# Patient Record
Sex: FEMALE | Race: WHITE | NOT HISPANIC OR LATINO | Employment: OTHER | URBAN - METROPOLITAN AREA
[De-identification: names, ages, dates, MRNs, and addresses within clinical notes are randomized per-mention and may not be internally consistent; named-entity substitution may affect disease eponyms.]

---

## 2023-08-03 LAB
ALANINE AMINOTRANSFERASE (SGPT) (U/L) IN SER/PLAS: 120 U/L (ref 7–45)
ALBUMIN (G/DL) IN SER/PLAS: 3.5 G/DL (ref 3.4–5)
ALKALINE PHOSPHATASE (U/L) IN SER/PLAS: 85 U/L (ref 33–136)
ANION GAP IN SER/PLAS: 11 MMOL/L (ref 10–20)
ASPARTATE AMINOTRANSFERASE (SGOT) (U/L) IN SER/PLAS: 128 U/L (ref 9–39)
BILIRUBIN TOTAL (MG/DL) IN SER/PLAS: 0.8 MG/DL (ref 0–1.2)
CALCIDIOL (25 OH VITAMIN D3) (NG/ML) IN SER/PLAS: 41 NG/ML
CALCIUM (MG/DL) IN SER/PLAS: 10.8 MG/DL (ref 8.6–10.6)
CARBON DIOXIDE, TOTAL (MMOL/L) IN SER/PLAS: 25 MMOL/L (ref 21–32)
CHLORIDE (MMOL/L) IN SER/PLAS: 104 MMOL/L (ref 98–107)
CREATININE (MG/DL) IN SER/PLAS: 1.17 MG/DL (ref 0.5–1.05)
ESTIMATED AVERAGE GLUCOSE FOR HBA1C: 108 MG/DL
GFR FEMALE: 44 ML/MIN/1.73M2
GLUCOSE (MG/DL) IN SER/PLAS: 99 MG/DL (ref 74–99)
HEMOGLOBIN A1C/HEMOGLOBIN TOTAL IN BLOOD: 5.4 %
MAGNESIUM (MG/DL) IN SER/PLAS: 1.65 MG/DL (ref 1.6–2.4)
POTASSIUM (MMOL/L) IN SER/PLAS: 5 MMOL/L (ref 3.5–5.3)
PROTEIN TOTAL: 6.6 G/DL (ref 6.4–8.2)
SODIUM (MMOL/L) IN SER/PLAS: 135 MMOL/L (ref 136–145)
UREA NITROGEN (MG/DL) IN SER/PLAS: 51 MG/DL (ref 6–23)

## 2023-11-05 DIAGNOSIS — I50.9 CONGESTIVE HEART FAILURE, UNSPECIFIED HF CHRONICITY, UNSPECIFIED HEART FAILURE TYPE (MULTI): Primary | ICD-10-CM

## 2023-11-08 RX ORDER — SPIRONOLACTONE 25 MG/1
25 TABLET ORAL DAILY
Qty: 90 TABLET | Refills: 0 | Status: SHIPPED | OUTPATIENT
Start: 2023-11-08 | End: 2024-01-29 | Stop reason: SDUPTHER

## 2023-11-16 ENCOUNTER — HOSPITAL ENCOUNTER (OUTPATIENT)
Dept: CARDIOLOGY | Facility: HOSPITAL | Age: 88
Discharge: HOME | End: 2023-11-16
Payer: COMMERCIAL

## 2023-11-16 DIAGNOSIS — Z95.0 PRESENCE OF CARDIAC PACEMAKER: Primary | ICD-10-CM

## 2023-11-16 DIAGNOSIS — Z95.0 PRESENCE OF CARDIAC PACEMAKER: ICD-10-CM

## 2023-11-16 DIAGNOSIS — I48.0 PAROXYSMAL ATRIAL FIBRILLATION (MULTI): ICD-10-CM

## 2023-11-16 DIAGNOSIS — I49.5 SICK SINUS SYNDROME (MULTI): ICD-10-CM

## 2023-11-16 PROCEDURE — 93279 PRGRMG DEV EVAL PM/LDLS PM: CPT

## 2023-11-16 PROCEDURE — 93279 PRGRMG DEV EVAL PM/LDLS PM: CPT | Performed by: INTERNAL MEDICINE

## 2023-12-11 DIAGNOSIS — I48.0 PAROXYSMAL ATRIAL FIBRILLATION (MULTI): Primary | ICD-10-CM

## 2023-12-12 RX ORDER — METOPROLOL SUCCINATE 50 MG/1
50 TABLET, EXTENDED RELEASE ORAL DAILY
Qty: 90 TABLET | Refills: 3 | Status: SHIPPED | OUTPATIENT
Start: 2023-12-12 | End: 2024-01-29 | Stop reason: SDUPTHER

## 2024-01-14 DIAGNOSIS — I50.32 CHRONIC DIASTOLIC CONGESTIVE HEART FAILURE (MULTI): Primary | ICD-10-CM

## 2024-01-14 DIAGNOSIS — I48.0 PAROXYSMAL ATRIAL FIBRILLATION (MULTI): ICD-10-CM

## 2024-01-17 PROBLEM — K21.9 GERD (GASTROESOPHAGEAL REFLUX DISEASE): Status: ACTIVE | Noted: 2024-01-17

## 2024-01-17 PROBLEM — I65.21 ARTERIOSCLEROSIS OF RIGHT CAROTID ARTERY: Status: ACTIVE | Noted: 2024-01-17

## 2024-01-17 PROBLEM — B18.2 HEPATITIS C, CHRONIC (MULTI): Status: ACTIVE | Noted: 2024-01-17

## 2024-01-17 PROBLEM — Z95.0 PRESENCE OF CARDIAC PACEMAKER: Status: ACTIVE | Noted: 2024-01-17

## 2024-01-17 PROBLEM — I50.32 CHRONIC DIASTOLIC CONGESTIVE HEART FAILURE (MULTI): Status: ACTIVE | Noted: 2024-01-17

## 2024-01-17 PROBLEM — I48.20 CHRONIC ATRIAL FIBRILLATION (MULTI): Status: ACTIVE | Noted: 2024-01-17

## 2024-01-17 PROBLEM — I10 BENIGN ESSENTIAL HYPERTENSION: Status: ACTIVE | Noted: 2024-01-17

## 2024-01-17 PROBLEM — G45.9 TIA (TRANSIENT ISCHEMIC ATTACK): Status: ACTIVE | Noted: 2024-01-17

## 2024-01-17 PROBLEM — R60.9 DEPENDENT EDEMA: Status: ACTIVE | Noted: 2024-01-17

## 2024-01-17 PROBLEM — E78.5 DYSLIPIDEMIA: Status: ACTIVE | Noted: 2024-01-17

## 2024-01-17 PROBLEM — I69.359 HEMIPARESIS AFFECTING NONDOMINANT SIDE AS LATE EFFECT OF CEREBROVASCULAR ACCIDENT (MULTI): Status: ACTIVE | Noted: 2024-01-17

## 2024-01-17 RX ORDER — LOSARTAN POTASSIUM 50 MG/1
50 TABLET ORAL DAILY
Qty: 90 TABLET | Refills: 0 | Status: SHIPPED | OUTPATIENT
Start: 2024-01-17 | End: 2024-01-29 | Stop reason: SDUPTHER

## 2024-01-17 NOTE — TELEPHONE ENCOUNTER
She has an active script for metoprolol with 3 refills at the pharmacy.  Called and confirmed her needs with her daughter.  She needs her losartan.

## 2024-01-17 NOTE — TELEPHONE ENCOUNTER
----- Message from Hyacinth Barnes sent at 1/15/2024  9:48 AM EST -----  Regarding: med refill  Metoprolol 50mg, Uses [facility] in [place]   She is out

## 2024-01-26 DIAGNOSIS — I50.32 CHRONIC DIASTOLIC CONGESTIVE HEART FAILURE (MULTI): Primary | ICD-10-CM

## 2024-01-26 RX ORDER — POTASSIUM CHLORIDE 20 MEQ/1
20 TABLET, EXTENDED RELEASE ORAL 2 TIMES DAILY
Qty: 60 TABLET | Refills: 0 | Status: SHIPPED | OUTPATIENT
Start: 2024-01-26 | End: 2024-02-08 | Stop reason: SDUPTHER

## 2024-01-26 RX ORDER — FUROSEMIDE 40 MG/1
40 TABLET ORAL DAILY
Qty: 90 TABLET | Refills: 0 | OUTPATIENT
Start: 2024-01-26

## 2024-01-26 NOTE — TELEPHONE ENCOUNTER
Her daughter answered.  She does not have enough potassium to get to her visit with Dr. Lopes on 2/8/24.  Will send 30 day supply.

## 2024-02-07 PROBLEM — R60.0 EDEMA OF BOTH LEGS: Status: ACTIVE | Noted: 2024-02-07

## 2024-02-07 PROBLEM — E55.9 VITAMIN D DEFICIENCY: Status: ACTIVE | Noted: 2024-02-07

## 2024-02-07 PROBLEM — E87.5 HYPERKALEMIA: Status: ACTIVE | Noted: 2024-02-07

## 2024-02-07 PROBLEM — K80.20 GALL BLADDER STONES: Status: ACTIVE | Noted: 2024-02-07

## 2024-02-07 PROBLEM — I50.33 ACUTE ON CHRONIC DIASTOLIC CHF (CONGESTIVE HEART FAILURE), NYHA CLASS 3 (MULTI): Status: ACTIVE | Noted: 2024-01-17

## 2024-02-07 PROBLEM — R06.09 DYSPNEA ON EXERTION: Status: ACTIVE | Noted: 2024-02-07

## 2024-02-07 PROBLEM — R73.03 PREDIABETES: Status: ACTIVE | Noted: 2024-02-07

## 2024-02-07 PROBLEM — E66.9 OBESITY WITH BODY MASS INDEX 30 OR GREATER: Status: ACTIVE | Noted: 2024-02-07

## 2024-02-07 RX ORDER — APIXABAN 5 MG/1
5 TABLET, FILM COATED ORAL 2 TIMES DAILY
COMMUNITY
End: 2024-02-18

## 2024-02-07 RX ORDER — CARVEDILOL 6.25 MG/1
6.25 TABLET ORAL 2 TIMES DAILY
COMMUNITY
Start: 2022-01-21 | End: 2024-02-08 | Stop reason: WASHOUT

## 2024-02-07 RX ORDER — CHOLECALCIFEROL (VITAMIN D3) 1250 MCG
50000 TABLET ORAL 2 TIMES WEEKLY
COMMUNITY
Start: 2017-12-19 | End: 2024-02-15 | Stop reason: ALTCHOICE

## 2024-02-07 RX ORDER — ATORVASTATIN CALCIUM 40 MG/1
40 TABLET, FILM COATED ORAL NIGHTLY
COMMUNITY
End: 2024-02-18 | Stop reason: SDUPTHER

## 2024-02-07 RX ORDER — ATENOLOL 50 MG/1
1 TABLET ORAL DAILY
COMMUNITY
End: 2024-02-08 | Stop reason: WASHOUT

## 2024-02-07 RX ORDER — OMEPRAZOLE 20 MG/1
20 CAPSULE, DELAYED RELEASE ORAL DAILY
COMMUNITY

## 2024-02-07 RX ORDER — LANOLIN ALCOHOL/MO/W.PET/CERES
1 CREAM (GRAM) TOPICAL DAILY
COMMUNITY
Start: 2023-09-21 | End: 2024-03-13 | Stop reason: WASHOUT

## 2024-02-07 RX ORDER — POTASSIUM CHLORIDE 20 MEQ/1
20 TABLET, EXTENDED RELEASE ORAL 2 TIMES DAILY
Qty: 180 TABLET | Refills: 1 | Status: CANCELLED | OUTPATIENT
Start: 2024-02-07 | End: 2024-08-05

## 2024-02-08 ENCOUNTER — OFFICE VISIT (OUTPATIENT)
Dept: CARDIOLOGY | Facility: CLINIC | Age: 89
End: 2024-02-08
Payer: COMMERCIAL

## 2024-02-08 ENCOUNTER — HOSPITAL ENCOUNTER (EMERGENCY)
Facility: HOSPITAL | Age: 89
Discharge: LEFT BEFORE DISCHARGE | End: 2024-02-08
Attending: STUDENT IN AN ORGANIZED HEALTH CARE EDUCATION/TRAINING PROGRAM
Payer: COMMERCIAL

## 2024-02-08 ENCOUNTER — APPOINTMENT (OUTPATIENT)
Dept: RADIOLOGY | Facility: HOSPITAL | Age: 89
End: 2024-02-08
Payer: COMMERCIAL

## 2024-02-08 VITALS
HEIGHT: 64 IN | WEIGHT: 158.07 LBS | BODY MASS INDEX: 26.99 KG/M2 | SYSTOLIC BLOOD PRESSURE: 127 MMHG | RESPIRATION RATE: 17 BRPM | HEART RATE: 64 BPM | DIASTOLIC BLOOD PRESSURE: 70 MMHG | TEMPERATURE: 98.2 F | OXYGEN SATURATION: 96 %

## 2024-02-08 VITALS
HEIGHT: 61 IN | DIASTOLIC BLOOD PRESSURE: 70 MMHG | WEIGHT: 158 LBS | BODY MASS INDEX: 29.83 KG/M2 | SYSTOLIC BLOOD PRESSURE: 130 MMHG | HEART RATE: 67 BPM

## 2024-02-08 DIAGNOSIS — I48.0 PAROXYSMAL ATRIAL FIBRILLATION (MULTI): ICD-10-CM

## 2024-02-08 DIAGNOSIS — R29.6 FALLS FREQUENTLY: Primary | ICD-10-CM

## 2024-02-08 DIAGNOSIS — G45.9 TIA (TRANSIENT ISCHEMIC ATTACK): ICD-10-CM

## 2024-02-08 DIAGNOSIS — I10 BENIGN ESSENTIAL HYPERTENSION: ICD-10-CM

## 2024-02-08 DIAGNOSIS — I50.32 CHRONIC DIASTOLIC CONGESTIVE HEART FAILURE (MULTI): ICD-10-CM

## 2024-02-08 DIAGNOSIS — I50.9 CONGESTIVE HEART FAILURE, UNSPECIFIED HF CHRONICITY, UNSPECIFIED HEART FAILURE TYPE (MULTI): ICD-10-CM
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
## 2024-02-08 PROCEDURE — 3078F DIAST BP <80 MM HG: CPT | Performed by: INTERNAL MEDICINE

## 2024-02-08 PROCEDURE — 72128 CT CHEST SPINE W/O DYE: CPT

## 2024-02-08 PROCEDURE — 99285 EMERGENCY DEPT VISIT HI MDM: CPT | Mod: 25

## 2024-02-08 PROCEDURE — 70450 CT HEAD/BRAIN W/O DYE: CPT

## 2024-02-08 PROCEDURE — 71250 CT THORAX DX C-: CPT

## 2024-02-08 PROCEDURE — 72128 CT CHEST SPINE W/O DYE: CPT | Mod: FOREIGN READ | Performed by: RADIOLOGY

## 2024-02-08 PROCEDURE — 3075F SYST BP GE 130 - 139MM HG: CPT | Performed by: INTERNAL MEDICINE

## 2024-02-08 PROCEDURE — 99285 EMERGENCY DEPT VISIT HI MDM: CPT | Mod: 25 | Performed by: STUDENT IN AN ORGANIZED HEALTH CARE EDUCATION/TRAINING PROGRAM

## 2024-02-08 PROCEDURE — 1160F RVW MEDS BY RX/DR IN RCRD: CPT | Performed by: INTERNAL MEDICINE

## 2024-02-08 PROCEDURE — 1159F MED LIST DOCD IN RCRD: CPT | Performed by: INTERNAL MEDICINE

## 2024-02-08 PROCEDURE — 72125 CT NECK SPINE W/O DYE: CPT | Performed by: RADIOLOGY

## 2024-02-08 PROCEDURE — 1036F TOBACCO NON-USER: CPT | Performed by: INTERNAL MEDICINE

## 2024-02-08 PROCEDURE — 99214 OFFICE O/P EST MOD 30 MIN: CPT | Performed by: INTERNAL MEDICINE

## 2024-02-08 PROCEDURE — 1126F AMNT PAIN NOTED NONE PRSNT: CPT | Performed by: INTERNAL MEDICINE

## 2024-02-08 PROCEDURE — 72125 CT NECK SPINE W/O DYE: CPT

## 2024-02-08 PROCEDURE — 70450 CT HEAD/BRAIN W/O DYE: CPT | Performed by: RADIOLOGY

## 2024-02-08 PROCEDURE — 93000 ELECTROCARDIOGRAM COMPLETE: CPT | Performed by: INTERNAL MEDICINE

## 2024-02-08 PROCEDURE — 71250 CT THORAX DX C-: CPT | Mod: FOREIGN READ | Performed by: RADIOLOGY

## 2024-02-08 RX ORDER — SPIRONOLACTONE 25 MG/1
25 TABLET ORAL DAILY
Qty: 90 TABLET | Refills: 1 | Status: SHIPPED | OUTPATIENT
Start: 2024-02-08 | End: 2024-02-08 | Stop reason: SDUPTHER

## 2024-02-08 RX ORDER — SPIRONOLACTONE 25 MG/1
25 TABLET ORAL DAILY
Qty: 30 TABLET | Refills: 0 | Status: SHIPPED | OUTPATIENT
Start: 2024-02-08 | End: 2024-02-18 | Stop reason: SDUPTHER

## 2024-02-08 RX ORDER — POTASSIUM CHLORIDE 20 MEQ/1
20 TABLET, EXTENDED RELEASE ORAL DAILY
Qty: 30 TABLET | Refills: 0 | Status: SHIPPED | OUTPATIENT
Start: 2024-02-08 | End: 2024-02-08

## 2024-02-08 RX ORDER — FUROSEMIDE 40 MG/1
40 TABLET ORAL DAILY
Qty: 90 TABLET | Refills: 1 | Status: SHIPPED | OUTPATIENT
Start: 2024-02-08 | End: 2024-02-18 | Stop reason: SDUPTHER

## 2024-02-08 RX ORDER — LOSARTAN POTASSIUM 50 MG/1
50 TABLET ORAL DAILY
Qty: 90 TABLET | Refills: 1 | Status: SHIPPED | OUTPATIENT
Start: 2024-02-08 | End: 2024-08-06

## 2024-02-08 RX ORDER — METOPROLOL SUCCINATE 50 MG/1
50 TABLET, EXTENDED RELEASE ORAL DAILY
Qty: 90 TABLET | Refills: 1 | Status: SHIPPED | OUTPATIENT
Start: 2024-02-08 | End: 2024-08-06

## 2024-02-08 RX ORDER — FUROSEMIDE 40 MG/1
40 TABLET ORAL DAILY
COMMUNITY
Start: 2023-11-01 | End: 2024-02-14 | Stop reason: ALTCHOICE

## 2024-02-08 ASSESSMENT — PAIN DESCRIPTION - PAIN TYPE: TYPE: ACUTE PAIN

## 2024-02-08 ASSESSMENT — PAIN DESCRIPTION - LOCATION
LOCATION: HEAD
LOCATION: HEAD

## 2024-02-08 ASSESSMENT — PAIN SCALES - GENERAL
PAINLEVEL_OUTOF10: 4
PAINLEVEL_OUTOF10: 5 - MODERATE PAIN

## 2024-02-08 ASSESSMENT — VISUAL ACUITY: OU: 1

## 2024-02-08 ASSESSMENT — PAIN DESCRIPTION - ORIENTATION
ORIENTATION: RIGHT
ORIENTATION: RIGHT

## 2024-02-08 ASSESSMENT — PAIN DESCRIPTION - DESCRIPTORS
DESCRIPTORS: ACHING
DESCRIPTORS: ACHING

## 2024-02-08 ASSESSMENT — PAIN - FUNCTIONAL ASSESSMENT
PAIN_FUNCTIONAL_ASSESSMENT: 0-10
PAIN_FUNCTIONAL_ASSESSMENT: 0-10

## 2024-02-08 NOTE — PROGRESS NOTES
"Chief Complaint:   Annual Exam     History Of Present Illness:    Anna Magdaleno is a 89 y.o. female with history of chronic diastolic heart failure, hypertension, carotid artery disease, history of right carotid endarterectomy in the setting of TIA, chronic atrial fibrillation and history of symptomatic bradycardia who has a pacemaker in place.     She is also following up in CHF clinic. She had an hospitalization with acute heart failure in December 2021. Likely in the setting of fall and increased diclofenac use, uncontrolled blood pressure. She had another fall yesterday and hit the right side of her head.  Also the right side of her back.  She did not go to the emergency room.    She does not have chest pain with exertion. Sometimes she gets abdominal pain depending on what she eats and her daughter states that may be because she has gallstones.     Reviewed pacemaker report, VVI paced, last checked Nov 16 2023. EKG reviewed and appears to be underlying atrial fibrillation with V paced rhythm.     She had a stress test in 2020 showing normal perfusion normal LV function. She had an echocardiogram in 2020 showing normal LV function, left atrial enlargement, mild pulmonary hypertension. .     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    Past Surgical History:  She has a past surgical history that includes Tonsillectomy (12/13/2017); Appendectomy (12/13/2017); Other surgical history (08/12/2021); Cardiac pacemaker placement (08/12/2021); Other surgical history (10/29/2020); CT angio head w and wo IV contrast (9/1/2020); and CT angio neck (9/1/2020).      Social History:  She reports that she has never smoked. She has never used smokeless tobacco. No history on file for alcohol use and drug use.    Family History:  No family history on file.     Allergies:  Patient has no allergy information on record.    Outpatient Medications:  Current Outpatient Medications   Medication Instructions    atenolol (Tenormin) 50 mg tablet 1 tablet, oral, Daily    atorvastatin (LIPITOR) 40 mg, oral, Nightly    carvedilol (COREG) 6.25 mg, oral, 2 times daily    cholecalciferol (VITAMIN D3) 50,000 Units, oral, 2 times weekly    Eliquis 5 mg, oral, 2 times daily    furosemide (LASIX) 40 mg, oral, Daily    losartan (COZAAR) 50 mg, oral, Daily    magnesium oxide (Mag-Ox) 400 mg (241.3 mg magnesium) tablet 1 tablet, oral, Daily    metoprolol succinate XL (TOPROL-XL) 50 mg, oral, Daily    omeprazole (PRILOSEC) 20 mg, oral, Daily    potassium chloride CR 20 mEq ER tablet 20 mEq, oral, 2 times daily    spironolactone (ALDACTONE) 25 mg, oral, Daily       Physical Exam:  Physical Exam  Vitals reviewed.   Constitutional:       Appearance: Normal appearance.   HENT:      Head:      Comments: Bruising behind right ear.  Neck:      Vascular: No carotid bruit or JVD.   Cardiovascular:      Rate and Rhythm: Normal rate. Rhythm irregular.      Pulses: Normal pulses.      Heart sounds: Normal heart sounds, S1 normal and S2 normal.   Pulmonary:      Effort: Pulmonary  "effort is normal. No respiratory distress.      Breath sounds: No wheezing or rales.   Abdominal:      General: Abdomen is flat.      Palpations: Abdomen is soft.   Musculoskeletal:      Right lower leg: No edema.      Left lower leg: No edema.   Skin:     General: Skin is warm.   Neurological:      Mental Status: She is alert and oriented to person, place, and time. Mental status is at baseline.   Psychiatric:         Mood and Affect: Mood normal.         Behavior: Behavior normal.           Last Labs:  CBC -  Lab Results   Component Value Date    WBC 5.4 02/01/2023    HGB 13.0 02/01/2023    HCT 40.7 02/01/2023    MCV 88 02/01/2023     02/01/2023       CMP -  Lab Results   Component Value Date    CALCIUM 10.8 (H) 08/03/2023    PHOS 3.0 09/02/2020    PROT 6.6 08/03/2023    ALBUMIN 3.5 08/03/2023     (H) 08/03/2023     (H) 08/03/2023    ALKPHOS 85 08/03/2023    BILITOT 0.8 08/03/2023       LIPID PANEL -   Lab Results   Component Value Date    CHOL 79 02/01/2023    TRIG 85 02/01/2023    HDL 28.0 (A) 02/01/2023    CHHDL 2.8 02/01/2023    LDLF 34 02/01/2023    VLDL 17 02/01/2023       RENAL FUNCTION PANEL -   Lab Results   Component Value Date    GLUCOSE 99 08/03/2023     (L) 08/03/2023    K 5.0 08/03/2023     08/03/2023    CO2 25 08/03/2023    ANIONGAP 11 08/03/2023    BUN 51 (H) 08/03/2023    CREATININE 1.17 (H) 08/03/2023    CALCIUM 10.8 (H) 08/03/2023    PHOS 3.0 09/02/2020    ALBUMIN 3.5 08/03/2023        Lab Results   Component Value Date     (H) 02/01/2023    HGBA1C 5.4 08/03/2023       Last Cardiology Tests:  ECG:  No results found for this or any previous visit from the past 1095 days.      Echo:  No results found for this or any previous visit from the past 1095 days.      Ejection Fractions:  No results found for: \"EF\"    Cath:  No results found for this or any previous visit from the past 1095 days.      Stress Test:  No results found for this or any previous visit " from the past 1095 days.      Cardiac Imaging:  No results found for this or any previous visit from the past 1095 days.          Assessment/Plan   In summary Ms. Magdaleno is a 89-year-old lady presenting with acute on chronic diastolic heart failure.     1-chronic diastolic heart failure-well compensated on physical examination. Continue present therapy.  May need adjustment of Aldactone and potassium does potassium was running high.  Advised her to get her blood work done today.  Continue to follow-up in CHF clinic. Sodium restriction discussed. We will check BMP.     2-chronic atrial fibrillation-heart rate well controlled and chronically anticoagulated.     3-peripheral vascular disease-appropriately on a statin. Will check lipid profile.     Follow-up in 6 months in heart failure clinic and in a year with me.   She had a fall on anticoagulants with bruising behind right ear.  I recommended she goes to the emergency room for a checkup.  I am also referring her to geriatrics for fall prevention.            Hetal Lopes MD

## 2024-02-08 NOTE — ED PROVIDER NOTES
HPI   Chief Complaint   Patient presents with    head injury/ hia     Fell Sunday/ hit right head and back / on eliquis       This is a 89-year-old female past ministry of chronic diastolic heart failure, hypertension, coronary artery disease, chronic atrial fibrillation and has a pacemaker on Eliquis presents emerged department for concerns of a fall and head strike.  Patient was in the bathroom when she lost her balance falling onto her right side hitting her head.  No loss consciousness.  Patient has no issues or concerns no symptoms prior to falling.                          Ronda Coma Scale Score: 15                  Patient History   Past Medical History:   Diagnosis Date    Abnormal results of thyroid function studies     Abnormal thyroid function test    Cerebral infarction due to embolism of right middle cerebral artery (CMS/HCC) 10/29/2020    Cerebral infarction due to embolism of right middle cerebral artery    Other reduced mobility     Declining mobility    Other specified cough     Cough due to ACE inhibitor    Pain in right ankle and joints of right foot     Acute right ankle pain    Personal history of malignant neoplasm of breast     History of malignant neoplasm of breast    Personal history of other diseases of the musculoskeletal system and connective tissue     History of low back pain    Personal history of other specified conditions     History of dizziness    Personal history of other specified conditions     History of headache    Personal history of transient ischemic attack (TIA), and cerebral infarction without residual deficits     History of cerebrovascular accident    Sick sinus syndrome (CMS/HCC)     Sick sinus syndrome    Unspecified fall, initial encounter     Accidental fall    Unspecified osteoarthritis, unspecified site     Degenerative joint disease     Past Surgical History:   Procedure Laterality Date    APPENDECTOMY  12/13/2017    Appendectomy    CARDIAC PACEMAKER PLACEMENT   08/12/2021    Pacemaker Permanent Placement    CT ANGIO NECK  9/1/2020    CT NECK ANGIO W AND WO IV CONTRAST 9/1/2020 [facility]    CT HEAD ANGIO W AND WO IV CONTRAST  9/1/2020    CT HEAD ANGIO W AND WO IV CONTRAST 9/1/2020 [facility]    OTHER SURGICAL HISTORY  08/12/2021    Internal carotid thromboendarterectomy    OTHER SURGICAL HISTORY  10/29/2020    Thrombectomy    TONSILLECTOMY  12/13/2017    Tonsillectomy     No family history on file.  Social History     Tobacco Use    Smoking status: Never    Smokeless tobacco: Never   Substance Use Topics    Alcohol use: Not on file    Drug use: Not on file       Physical Exam   ED Triage Vitals [02/08/24 1158]   Temperature Heart Rate Respirations BP   36.6 °C (97.8 °F) 56 18 148/81      Pulse Ox Temp Source Heart Rate Source Patient Position   97 % Temporal Monitor Sitting      BP Location FiO2 (%)     Left arm --       Physical Exam  Constitutional:       Appearance: Normal appearance.   HENT:      Head: Normocephalic and atraumatic. No raccoon eyes, Rodriguez's sign, abrasion, contusion or laceration.   Eyes:      General: Vision grossly intact.      Conjunctiva/sclera: Conjunctivae normal.   Neck:      Trachea: Trachea normal.   Cardiovascular:      Rate and Rhythm: Normal rate and regular rhythm.      Pulses: Normal pulses.      Heart sounds: Normal heart sounds.   Pulmonary:      Effort: Pulmonary effort is normal.      Breath sounds: Normal breath sounds.   Abdominal:      General: Bowel sounds are normal. There is no distension. There are no signs of injury.      Palpations: Abdomen is soft.      Tenderness: There is no abdominal tenderness.   Musculoskeletal:         General: Normal range of motion.      Cervical back: Normal range of motion and neck supple. No erythema, tenderness or crepitus.      Comments: Bruising on patient's right flank with tenderness to palpation.   Skin:     General: Skin is warm and dry.   Neurological:      General: No  focal deficit present.      Mental Status: She is alert and oriented to person, place, and time.       Labs Reviewed - No data to display  CT head W O contrast trauma protocol   Final Result   No acute intracranial pathology.        MACRO:   None        Signed by: Earnestine Monsivais 2/8/2024 12:49 PM   Dictation workstation:   [id number]      CT cervical spine wo IV contrast   Final Result        1. No fracture cervical spine.   2. 3 mm anterior subluxation of C2 with respect to C3, likely   degenerative.   3. Incidental right thyroid nodules        MACRO:   Incidental Finding:  There are few small hypoattenuating nodules   measuring equal to or greater than 1.5 cm in the thyroid gland.   (**-YCF-**)        Instructions:  Further evaluation with nonemergent thyroid ultrasound.   (Managing Incidental Thyroid Nodules Detected on Imaging: White Paper   of the ACR Incidental Thyroid Findings Committee. Kailee Cook et   al. Journal of the American College of Radiology,Volume 12, Issue 2,   143 - 150.) [email or web address]        Signed by: Earnestine Monsivais 2/8/2024 12:54 PM   Dictation workstation:   [id number]      CT thoracic spine wo IV contrast    (Results Pending)   CT chest wo IV contrast    (Results Pending)       ED Course & MDM   ED Course as of 02/08/24 1340   Thu Feb 08, 2024   1255 IMPRESSION:  No acute intracranial pathology.   [CF]   1303 IMPRESSION:      1. No fracture cervical spine.  2. 3 mm anterior subluxation of C2 with respect to C3, likely  degenerative.  3. Incidental right thyroid nodules   [CF]      ED Course User Index  [CF] Vanesa Sauer MD       Medical Decision Making  89-year-old female presents for mechanical fall on Eliquis .  Less likely syncope given patient states that she lost her balance.  Her EKG on my read shows atrial fibrillation at a rate of 80 bpm, she does have a pacemaker.  Several right bundle branch block pattern, with PVCs similar to her EKG in April 2020.  Patient  vitals are stable.  She does have bruising and tenderness palpation on the right side of her hip but no focal logic deficits that are new.  CT of her head no acute intracranial pathology.  CT of her neck did show incidental right thyroid nodule which I did tell patient and her daughter that they will need to follow-up.  Patient's images are still pending for her chest, abdomen and spine but they would like to leave AGAINST MEDICAL ADVICE since her daughter has an appointment.  They have been doing the return precautions and will follow-up with images.        Procedure  Critical Care    Performed by: Vanesa Sauer MD  Authorized by: Vanesa Sauer MD    Critical care provider statement:     Critical care time (minutes):  15    Critical care time was exclusive of:  Separately billable procedures and treating other patients    Critical care was necessary to treat or prevent imminent or life-threatening deterioration of the following conditions:  Trauma    Critical care was time spent personally by me on the following activities:  Blood draw for specimens, development of treatment plan with patient or surrogate, discussions with consultants, evaluation of patient's response to treatment, ordering and performing treatments and interventions, ordering and review of laboratory studies, ordering and review of radiographic studies, pulse oximetry, re-evaluation of patient's condition, review of old charts and examination of patient       Vanesa Sauer MD  02/08/24 6028       Vanesa Sauer MD  02/08/24 3508

## 2024-02-09 DIAGNOSIS — I50.9 CONGESTIVE HEART FAILURE, UNSPECIFIED HF CHRONICITY, UNSPECIFIED HEART FAILURE TYPE (MULTI): ICD-10-CM

## 2024-02-12 RX ORDER — SPIRONOLACTONE 25 MG/1
25 TABLET ORAL DAILY
Qty: 90 TABLET | Refills: 0 | OUTPATIENT
Start: 2024-02-12

## 2024-02-12 RX ORDER — POTASSIUM CHLORIDE 20 MEQ/1
20 TABLET, EXTENDED RELEASE ORAL 2 TIMES DAILY
Qty: 180 TABLET | Refills: 0 | OUTPATIENT
Start: 2024-02-12

## 2024-02-13 NOTE — TELEPHONE ENCOUNTER
Called and spoke with her daughter, Adwoa.  Her mom needs to get the blood work ordered at her visit on 2/8 so Dr. Lopes can give her appropriate medication and dosages.  She thought the ER did her blood work when they were there.  She has a visit coming up and will get her labs that day.

## 2024-02-13 NOTE — TELEPHONE ENCOUNTER
----- Message from Dave Frias sent at 2/13/2024 10:45 AM EST -----  Regarding: Med ?'s  Hi, pt says she's been expecting a phone call for a week to be instructed on which medications to take. Also went to ER last night.

## 2024-02-14 ENCOUNTER — OFFICE VISIT (OUTPATIENT)
Dept: OTOLARYNGOLOGY | Facility: CLINIC | Age: 89
End: 2024-02-14
Payer: COMMERCIAL

## 2024-02-14 ENCOUNTER — CLINICAL SUPPORT (OUTPATIENT)
Dept: AUDIOLOGY | Facility: CLINIC | Age: 89
End: 2024-02-14
Payer: COMMERCIAL

## 2024-02-14 VITALS — WEIGHT: 158 LBS | BODY MASS INDEX: 31.02 KG/M2 | HEIGHT: 60 IN

## 2024-02-14 DIAGNOSIS — H90.3 SENSORINEURAL HEARING LOSS (SNHL) OF BOTH EARS: Primary | ICD-10-CM

## 2024-02-14 DIAGNOSIS — H61.23 BILATERAL IMPACTED CERUMEN: ICD-10-CM
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
## 2024-02-14 PROBLEM — C50.919 MALIGNANT NEOPLASM OF BREAST (MULTI): Status: ACTIVE | Noted: 2024-02-14

## 2024-02-14 PROBLEM — T46.4X5A ADVERSE EFFECT OF ANGIOTENSIN-CONVERTING ENZYME INHIBITOR: Status: ACTIVE | Noted: 2024-02-14

## 2024-02-14 PROBLEM — I63.40 CEREBRAL INFARCTION DUE TO EMBOLISM OF CEREBRAL ARTERY (MULTI): Status: ACTIVE | Noted: 2024-02-14

## 2024-02-14 PROBLEM — I51.7 LEFT ATRIAL DILATION: Status: ACTIVE | Noted: 2024-02-14

## 2024-02-14 PROBLEM — R60.0 EDEMA OF LOWER EXTREMITY: Status: ACTIVE | Noted: 2024-02-14

## 2024-02-14 PROBLEM — M19.90 OSTEOARTHRITIS: Status: ACTIVE | Noted: 2024-02-14

## 2024-02-14 PROBLEM — E83.42 HYPOMAGNESEMIA: Status: ACTIVE | Noted: 2024-02-14

## 2024-02-14 PROBLEM — I35.0 MODERATE AORTIC VALVE STENOSIS: Status: ACTIVE | Noted: 2024-02-14

## 2024-02-14 PROBLEM — Z85.3 HISTORY OF MALIGNANT NEOPLASM OF BREAST: Status: ACTIVE | Noted: 2024-02-14

## 2024-02-14 PROBLEM — M25.579 ACUTE ANKLE PAIN: Status: ACTIVE | Noted: 2024-02-14

## 2024-02-14 PROBLEM — R42 DIZZINESS: Status: ACTIVE | Noted: 2024-02-14

## 2024-02-14 PROBLEM — R94.6 ABNORMAL FINDING ON THYROID FUNCTION TEST: Status: ACTIVE | Noted: 2024-02-14

## 2024-02-14 PROBLEM — Z86.73 HISTORY OF CEREBROVASCULAR ACCIDENT: Status: ACTIVE | Noted: 2024-02-14

## 2024-02-14 PROBLEM — E83.52 HYPERCALCEMIA: Status: ACTIVE | Noted: 2024-02-14

## 2024-02-14 PROBLEM — R51.9 HEADACHE: Status: ACTIVE | Noted: 2024-02-14

## 2024-02-14 PROCEDURE — 1160F RVW MEDS BY RX/DR IN RCRD: CPT | Performed by: NURSE PRACTITIONER

## 2024-02-14 PROCEDURE — 92553 AUDIOMETRY AIR & BONE: CPT | Performed by: AUDIOLOGIST

## 2024-02-14 PROCEDURE — 1036F TOBACCO NON-USER: CPT | Performed by: NURSE PRACTITIONER

## 2024-02-14 PROCEDURE — 99204 OFFICE O/P NEW MOD 45 MIN: CPT | Performed by: NURSE PRACTITIONER

## 2024-02-14 PROCEDURE — 92550 TYMPANOMETRY & REFLEX THRESH: CPT | Performed by: AUDIOLOGIST

## 2024-02-14 PROCEDURE — 1159F MED LIST DOCD IN RCRD: CPT | Performed by: NURSE PRACTITIONER

## 2024-02-14 PROCEDURE — 69210 REMOVE IMPACTED EAR WAX UNI: CPT | Performed by: NURSE PRACTITIONER

## 2024-02-14 PROCEDURE — 1126F AMNT PAIN NOTED NONE PRSNT: CPT | Performed by: NURSE PRACTITIONER

## 2024-02-14 NOTE — PROGRESS NOTES
Subjective   Patient ID: Anna Magdaleno is a 89 y.o. female who presents for hearing issues .  HPI    Patient is new and presents here today for hearing loss.  She is accompanied by her daughter.  Her daughter reports that patient has been hard of hearing however it is recently worse.  She has hearing loss for the past 3 years. Asking twice to repeat what was said.  No recent URI or fever.  No hearing aids in the past. No chronic ear infection, history of ear surgery or trauma to the ears.  No complaints of tinnitus, dizziness or vertigo.  No facial numbness, facial weakness or facial pain.  No noise exposure.  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    She had a fall recently wherein  she fell in the bathroom as she turned to grab the towel and lost balance.  She was taken to the ER for CT scan. They were told that all were ok.  No subdural hematoma.  She did not lose consciousness.  She did sustain bruising on the right neck and mastoid area.        Mild to moderate bilateral sensorineural hearing loss sloping to severe at the highest frequency.  Normal tympanogram left ear and dynamic eardrum right ear.  Acoustic reflexes absent at 2000 Hz.  Word discrimination unable to obtain due to language barrier.  Review of Systems      All other systems have been reviewed and are negative for complaints except for those mentioned in history of present illness, past medical history and problem list       Objective   Physical Exam    CONSTITUTIONAL: No acute distress, normal facial features; No fever; no chills  VOICE: No hoarseness or other audible abnormality  RESPIRATION: Breathing comfortably, no stridor; normal breathing effort  CV: No cyanosis visible on the face and neck area  EYES:Pupils equal and round ; no erythema; conjunctiva clear; sclera white  NEURO: Alert and oriented, able to raise eyebrows symmetrical bilateral, smile with no facial droop, able to swallow  HEAD AND FACE: Symmetric  facial features, no masses or lesions    Right ear examination: External ear normal.  EAC with impacted cerumen.  TM not visualized.  Left ear examination: External ear normal.  EAC with impacted cerumen.  TM not visualized.    NOSE: External nose midline  ORAL CAVITY: No lesions of external lips; uvula is midline; tongue with good mobility; no gross mass in oral cavity; mucosa appears pink   NECK/LYMPH: No obvious deformity or lesions; trachea is midline  PSYCH: Alert and oriented with appropriate mood and affect.    Patient ID: Anna Magdaleno is a 89 y.o. female.    Procedures    Cerumen removal    Consent:  The planned procedure is discussed including possible risk, benefits and alternative treatments reviewed.  Verbal consent is obtained.    Indications:Obstructed cerumen is noted affecting hearing and causing discomfort.    Procedure: The ears are examined microscopically.  Using speculum, alligator, #7, #5 suction the obstructive cerumen in both the ears were removed.    Findings: Cerumen and epithelial debris obstruction in both external auditory canals.  Inspection of tympanic membrane after cleaning showed intact with no effusion, retraction or perforation.    Post procedure: The patient tolerated the procedure well without complications       Assessment/Plan       1. Sensorineural hearing loss (SNHL) of both ears        2. Bilateral impacted cerumen          Both ears were cleaned today using microscope and instruments and patient was able to tolerate procedure well.  After cleaning patient stated that she can hear better.  Her hearing test showed that she has mild to moderate severe sensorineural hearing loss in both the ears.  I talked with the daughter to see if patient would like to proceed with hearing aids.  Per daughter patient is not easy to convince to wear devices but she will try to see if she wants to proceed with hearing aids.  I will sign hearing aid clearance form once they want to pursue the  device.  Otherwise follow-up in 6 months for ear cleaning.         NICOLASA Varma-CNP 02/14/24 10:17 AM

## 2024-02-14 NOTE — PATIENT INSTRUCTIONS
PATIENT INSTRUCTIONS FOR EAR WAX BUILDUP (A.K.A. CERUMEN)    To clean the ears, wash the external ear with a cloth, but do not insert anything into the ear canal.  Most cases of ear wax blockage respond to home treatments used to soften wax.   You may try placing a few drops of mineral oil or baby oil once a week to help keep the wax soft. We do NOT recommend placing ANYTHING in the ear canal. Doing so may cause wax to be pushed back into the ear canal and stuck. It also risks injury to the ear canal and ear drum. We recommend avoiding using cotton tipped applicators, tissues, paper, or any rigid object in the ear canal.   Some people require regular cleanings every year or so to keep the ear canals open.     Consider hearing aids.  Please call insurance company regarding hearing aid benefits.

## 2024-02-14 NOTE — PROGRESS NOTES
COMPREHENSIVE AUDIOMETRIC EVALUATION      Name:  Anna Magdaleno  :  [id number]  Age:  89 y.o.  Date of Evaluation:  24   Referring Provider:  ERAN Coleman    History:  Ms. Magdaleno was seen today for an evaluation of hearing accompanied by her daughter.  Of note, patient does not speak English (Language: [place]) so information obtained through daughter translating. Patient reported tinnitus, bilaterally and concern for hearing loss.  When asked, patient denied otalgia and aural fullness    See audiometric evaluation at end of this report or scanned under media tab    OTOSCOPY:       Right Ear: Significant though non-occluding cerumen       Left Ear: Significant though non-occluding cerumen    226 Hz TYMPANOMETRY:       Right Ear: Type Ad: hypercompliant with normal peak pressure and ear canal volume       Left Ear: Type A: normal peak pressure, compliance, and ear canal volume, consistent with middle ear function within normal limits    AUDIOMETRIC EVALUATION (Phones):       Right Ear: Moderate sloping to Profound, Sensorineural hearing loss                 Left Ear: Essentially Moderately severe sloping to Profound, Sensorineural hearing loss           NOTE: Borderline clinically significant low frequency asymmetry    Test technique:  Pure Tone Audiometry  Reliability:   good    SPEECH AWARENESS THRESHOLD:       Right Ear:  65 dBHL in good agreement with PTA       Left Ear:  60 dBHL in good agreement with PTA    WORD RECOGNITION: CNT    IPSILATERAL ACOUSTIC REFLEXES:       Right Ear:  Present from 500 Hz to 1000 Hz and absent at 2000 Hz       Left Ear:     Present from 500 Hz to 1000 Hz and absent at 2000 Hz    NOTE: Results inconsistent with retrocochlear involvement though somewhat surprising reflexes are present at 1000 Hz given patient's hearing sensitivity    CONTRALATERAL REFLEXES: CNT due to significant cerumen    DISCUSSION:   Discussed results and recommendations with daughter.   Questions were addressed and the patient was encouraged to contact our department should concerns arise.    RECOMMENDATIONS:  -Recommend patient pursue hearing aids, binaurally  -Recommend patient return should concerns for changes in hearing sensitivity arise or as medically indicated.       Melany Fisher, CCC-A     Appt: 9:30 - 10:00 AM

## 2024-02-15 ENCOUNTER — LAB (OUTPATIENT)
Dept: LAB | Facility: LAB | Age: 89
End: 2024-02-15
Payer: COMMERCIAL

## 2024-02-15 ENCOUNTER — OFFICE VISIT (OUTPATIENT)
Dept: PRIMARY CARE | Facility: CLINIC | Age: 89
End: 2024-02-15
Payer: COMMERCIAL

## 2024-02-15 VITALS
DIASTOLIC BLOOD PRESSURE: 60 MMHG | SYSTOLIC BLOOD PRESSURE: 116 MMHG | BODY MASS INDEX: 29.07 KG/M2 | HEIGHT: 61 IN | WEIGHT: 154 LBS

## 2024-02-15 DIAGNOSIS — I50.33: ICD-10-CM

## 2024-02-15 DIAGNOSIS — R07.89 RIGHT-SIDED CHEST WALL PAIN: ICD-10-CM

## 2024-02-15 DIAGNOSIS — N18.9 CHRONIC KIDNEY DISEASE, UNSPECIFIED CKD STAGE: ICD-10-CM

## 2024-02-15 DIAGNOSIS — G45.9 TIA (TRANSIENT ISCHEMIC ATTACK): ICD-10-CM

## 2024-02-15 DIAGNOSIS — K21.9 GASTROESOPHAGEAL REFLUX DISEASE, UNSPECIFIED WHETHER ESOPHAGITIS PRESENT: ICD-10-CM

## 2024-02-15 DIAGNOSIS — I50.32 CHRONIC DIASTOLIC CONGESTIVE HEART FAILURE (MULTI): ICD-10-CM

## 2024-02-15 DIAGNOSIS — R73.03 PREDIABETES: ICD-10-CM

## 2024-02-15 DIAGNOSIS — B18.2 CHRONIC HEPATITIS C WITHOUT HEPATIC COMA (MULTI): ICD-10-CM

## 2024-02-15 DIAGNOSIS — E78.5 HYPERLIPIDEMIA, UNSPECIFIED HYPERLIPIDEMIA TYPE: ICD-10-CM

## 2024-02-15 DIAGNOSIS — E04.1 THYROID NODULE: ICD-10-CM
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
## 2024-02-15 PROBLEM — I69.359 HEMIPARESIS AFFECTING NONDOMINANT SIDE AS LATE EFFECT OF CEREBROVASCULAR ACCIDENT (MULTI): Status: RESOLVED | Noted: 2024-01-17 | Resolved: 2024-02-15

## 2024-02-15 PROBLEM — C50.919 MALIGNANT NEOPLASM OF BREAST (MULTI): Status: RESOLVED | Noted: 2024-02-14 | Resolved: 2024-02-15

## 2024-02-15 LAB
ANION GAP SERPL CALC-SCNC: 15 MMOL/L (ref 10–20)
BUN SERPL-MCNC: 33 MG/DL (ref 6–23)
CALCIUM SERPL-MCNC: 11.1 MG/DL (ref 8.6–10.6)
CHLORIDE SERPL-SCNC: 101 MMOL/L (ref 98–107)
CHOLEST SERPL-MCNC: 92 MG/DL (ref 0–199)
CHOLESTEROL/HDL RATIO: 2.9
CO2 SERPL-SCNC: 25 MMOL/L (ref 21–32)
CREAT SERPL-MCNC: 0.94 MG/DL (ref 0.5–1.05)
EGFRCR SERPLBLD CKD-EPI 2021: 57 ML/MIN/1.73M*2
ERYTHROCYTE [DISTWIDTH] IN BLOOD BY AUTOMATED COUNT: 15.1 % (ref 11.5–14.5)
EST. AVERAGE GLUCOSE BLD GHB EST-MCNC: 108 MG/DL
GLUCOSE SERPL-MCNC: 87 MG/DL (ref 74–99)
HBA1C MFR BLD: 5.4 %
HCT VFR BLD AUTO: 41 % (ref 36–46)
HDLC SERPL-MCNC: 31.2 MG/DL
HGB BLD-MCNC: 13.8 G/DL (ref 12–16)
LDLC SERPL CALC-MCNC: 43 MG/DL
MCH RBC QN AUTO: 31.2 PG (ref 26–34)
MCHC RBC AUTO-ENTMCNC: 33.7 G/DL (ref 32–36)
MCV RBC AUTO: 93 FL (ref 80–100)
NON HDL CHOLESTEROL: 61 MG/DL (ref 0–149)
NRBC BLD-RTO: 0 /100 WBCS (ref 0–0)
PLATELET # BLD AUTO: 178 X10*3/UL (ref 150–450)
POTASSIUM SERPL-SCNC: 4.3 MMOL/L (ref 3.5–5.3)
RBC # BLD AUTO: 4.42 X10*6/UL (ref 4–5.2)
SODIUM SERPL-SCNC: 137 MMOL/L (ref 136–145)
TRIGL SERPL-MCNC: 88 MG/DL (ref 0–149)
VLDL: 18 MG/DL (ref 0–40)
WBC # BLD AUTO: 5.3 X10*3/UL (ref 4.4–11.3)

## 2024-02-15 PROCEDURE — 1126F AMNT PAIN NOTED NONE PRSNT: CPT | Performed by: INTERNAL MEDICINE

## 2024-02-15 PROCEDURE — 1036F TOBACCO NON-USER: CPT | Performed by: INTERNAL MEDICINE

## 2024-02-15 PROCEDURE — 80061 LIPID PANEL: CPT

## 2024-02-15 PROCEDURE — 3074F SYST BP LT 130 MM HG: CPT | Performed by: INTERNAL MEDICINE

## 2024-02-15 PROCEDURE — 99215 OFFICE O/P EST HI 40 MIN: CPT | Performed by: INTERNAL MEDICINE

## 2024-02-15 PROCEDURE — 85027 COMPLETE CBC AUTOMATED: CPT

## 2024-02-15 PROCEDURE — 3078F DIAST BP <80 MM HG: CPT | Performed by: INTERNAL MEDICINE

## 2024-02-15 PROCEDURE — 1159F MED LIST DOCD IN RCRD: CPT | Performed by: INTERNAL MEDICINE

## 2024-02-15 PROCEDURE — 36415 COLL VENOUS BLD VENIPUNCTURE: CPT

## 2024-02-15 PROCEDURE — 83036 HEMOGLOBIN GLYCOSYLATED A1C: CPT

## 2024-02-15 PROCEDURE — 80048 BASIC METABOLIC PNL TOTAL CA: CPT

## 2024-02-15 PROCEDURE — 1124F ACP DISCUSS-NO DSCNMKR DOCD: CPT | Performed by: INTERNAL MEDICINE

## 2024-02-15 PROCEDURE — 1160F RVW MEDS BY RX/DR IN RCRD: CPT | Performed by: INTERNAL MEDICINE

## 2024-02-15 ASSESSMENT — ENCOUNTER SYMPTOMS
DEPRESSION: 0
OCCASIONAL FEELINGS OF UNSTEADINESS: 1
LOSS OF SENSATION IN FEET: 0

## 2024-02-15 NOTE — PATIENT INSTRUCTIONS

## 2024-02-15 NOTE — PROGRESS NOTES
"Subjective   Patient ID: Anna Magdaleno is a 89 y.o. female who presents for Fall.  Patient is accompanied by her daughter who is in the room with patient with her consent polluted communication, provide support and coordinate care.  HPI   Anna is a 89-year-old [place] female who comes today for follow-up after recent visit to the hospital after an accidental fall in her shower.  No history of head injury, LOC, chest pain, shortness of breath, fever or cough.  Patient did undergo imaging including CT of the chest, CT of the cervical and thoracic spine as well as CT head and they were all unremarkable for any acute findings and though an incidental right thyroid nodule was noted.  Patient is following up with cardiology on a regular basis.  She does not like to use a walker or any other device while at home and declines physical therapy as well.  Patient declines vaccinations.  She has some labs which are due and have been ordered by cardiology.  Labs done in August 2023 were reviewed and discussed with daughter.  No history of loss of appetite, abdominal pain, nausea, vomiting, diarrhea or genitourinary symptoms.  Patient has pain in the right posterior chest wall for which daughter has been giving her Tylenol and applying lidocaine patches.  Review of Systems  As per \[facility]\""  Objective   /60 (BP Location: Right arm, Patient Position: Sitting, BP Cuff Size: Adult)   Ht 1.549 m (5' 1\")   Wt 69.9 kg (154 lb)   BMI 29.10 kg/m²     Physical Exam  General - Well developed, well appearing, elderly CF in no acute respiratory distress  Eyes - normal conjunctiva with no pallor or icterus, normal extraocular movements  Neck - No JVD, thyromegaly or lymphadenopathy  Lungs - no respiratory distress and lungs clear to auscultation bilaterally with no rales or wheezes, decreased air entry B/L  Heart - normal S1, S2 with normal heart rate, irregular rhythm and EMERSON+   Chest wall-minimal ecchymosis noted in the posterior chest " wall with no significant tenderness  Abdomen - soft, nontender with no masses or organomegaly  Extremities - trace bipedal edema  Neuro - grossly normal neuro exam with no focal neuro deficits  Psych - normal mental status, mood and affect   Skin - no rashes or ulcers  JENNIFER - pt is currently in a wheelchair  Assessment/Plan     1.  Status post accidental fall at home-daughter is thinking of purchasing a walker for the patient, patient declines physical therapy  2.  Acute on chronic congestive heart failure-patient is being managed by cardiology and will follow-up with them as advised  3.  Chronic hepatitis C  4.  Incidental right thyroid nodule-endocrinology referral has been provided  5.  CKD-patient will be referred to nephrology, BMP and CBC will be checked  6.  GERD-stable and patient is on PPI  7.  Hyperlipidemia-patient is on statin therapy, lipids have been ordered by cardiology  8.  Prediabetes-hemoglobin A1c will be checked  9.  Right-sided chest wall pain after fall-due to CKD patient will not be prescribed any NSAID therapy, I have advised daughter to continue giving her Tylenol and applying topical lidocaine patches during the day  Follow-up in August 2024 for annual wellness exam.  40 minutes spent rooming the patient, reviewing records, eliciting history, examining patient, counseling, coordination of care and in documentation.  This note was partially generated using the Dragon voice recognition system. There may be some incorrect words, spelling and punctuation errors that were not corrected prior to committing the note to the patient's medical record.   Patient was identified as a fall risk. Risk prevention instructions provided.

## 2024-02-15 NOTE — PROGRESS NOTES
"Subjective   Patient ID: Anna Magdaleno is a 89 y.o. female who presents for Fall.    HPI     Review of Systems    Objective   Ht 1.549 m (5' 1\")   Wt 69.9 kg (154 lb)   BMI 29.10 kg/m²     Physical Exam    Assessment/Plan          "

## 2024-02-16 ENCOUNTER — TELEPHONE (OUTPATIENT)
Dept: PRIMARY CARE | Facility: CLINIC | Age: 89
End: 2024-02-16
Payer: COMMERCIAL

## 2024-02-16 DIAGNOSIS — G45.9 TIA (TRANSIENT ISCHEMIC ATTACK): ICD-10-CM

## 2024-02-16 DIAGNOSIS — I48.0 PAROXYSMAL ATRIAL FIBRILLATION (MULTI): ICD-10-CM

## 2024-02-16 DIAGNOSIS — I65.21 ARTERIOSCLEROSIS OF RIGHT CAROTID ARTERY: Primary | ICD-10-CM

## 2024-02-16 DIAGNOSIS — I50.9 CONGESTIVE HEART FAILURE, UNSPECIFIED HF CHRONICITY, UNSPECIFIED HEART FAILURE TYPE (MULTI): ICD-10-CM

## 2024-02-16 DIAGNOSIS — I50.32 CHRONIC DIASTOLIC CONGESTIVE HEART FAILURE (MULTI): ICD-10-CM

## 2024-02-16 DIAGNOSIS — Z86.73 HISTORY OF CEREBROVASCULAR ACCIDENT: ICD-10-CM

## 2024-02-18 RX ORDER — SPIRONOLACTONE 25 MG/1
25 TABLET ORAL DAILY
Qty: 30 TABLET | Refills: 0 | Status: SHIPPED | OUTPATIENT
Start: 2024-02-18 | End: 2024-03-11 | Stop reason: SDUPTHER

## 2024-02-18 RX ORDER — ATORVASTATIN CALCIUM 40 MG/1
40 TABLET, FILM COATED ORAL NIGHTLY
Qty: 90 TABLET | Refills: 1 | Status: SHIPPED | OUTPATIENT
Start: 2024-02-18 | End: 2024-08-16

## 2024-02-18 RX ORDER — FUROSEMIDE 40 MG/1
40 TABLET ORAL DAILY
Qty: 90 TABLET | Refills: 1 | Status: SHIPPED | OUTPATIENT
Start: 2024-02-18 | End: 2024-08-16

## 2024-02-18 RX ORDER — APIXABAN 5 MG/1
5 TABLET, FILM COATED ORAL 2 TIMES DAILY
Qty: 180 TABLET | Refills: 1 | Status: SHIPPED | OUTPATIENT
Start: 2024-02-18

## 2024-02-21 ENCOUNTER — TELEPHONE (OUTPATIENT)
Dept: CARDIOLOGY | Facility: CLINIC | Age: 89
End: 2024-02-21
Payer: COMMERCIAL

## 2024-02-21 NOTE — TELEPHONE ENCOUNTER
Her daughter called back.  I let her know the labs are stable and her prescriptions were sent in.  She verbalized understanding.

## 2024-02-21 NOTE — TELEPHONE ENCOUNTER
----- Message from Hetal Lopes MD sent at 2/20/2024  4:18 PM EST -----  Results reviewed. RN to call patient. No critical results, follow up as usual to discuss in details.

## 2024-02-26 ENCOUNTER — APPOINTMENT (OUTPATIENT)
Dept: GERIATRIC MEDICINE | Facility: CLINIC | Age: 89
End: 2024-02-26
Payer: COMMERCIAL

## 2024-03-11 DIAGNOSIS — I48.0 PAROXYSMAL ATRIAL FIBRILLATION (MULTI): ICD-10-CM

## 2024-03-11 DIAGNOSIS — I50.9 CONGESTIVE HEART FAILURE, UNSPECIFIED HF CHRONICITY, UNSPECIFIED HEART FAILURE TYPE (MULTI): ICD-10-CM

## 2024-03-11 RX ORDER — SPIRONOLACTONE 25 MG/1
25 TABLET ORAL DAILY
Qty: 90 TABLET | Refills: 1 | Status: SHIPPED | OUTPATIENT
Start: 2024-03-11 | End: 2024-09-07

## 2024-03-11 NOTE — TELEPHONE ENCOUNTER
----- Message from Ej Meneses sent at 3/11/2024 11:20 AM EDT -----  Regarding: Medication Refill  Spironalactone 25mg(this one is out) and metoprolol succinate 50mg and they need to be sent to [facility] in [place].

## 2024-03-13 ENCOUNTER — OFFICE VISIT (OUTPATIENT)
Dept: GERIATRIC MEDICINE | Facility: CLINIC | Age: 89
End: 2024-03-13
Payer: COMMERCIAL

## 2024-03-13 ENCOUNTER — APPOINTMENT (OUTPATIENT)
Dept: GERIATRIC MEDICINE | Facility: CLINIC | Age: 89
End: 2024-03-13
Payer: COMMERCIAL

## 2024-03-13 ENCOUNTER — CLINICAL SUPPORT (OUTPATIENT)
Dept: GERIATRIC MEDICINE | Facility: CLINIC | Age: 89
End: 2024-03-13
Payer: COMMERCIAL

## 2024-03-13 VITALS
BODY MASS INDEX: 29.65 KG/M2 | DIASTOLIC BLOOD PRESSURE: 81 MMHG | SYSTOLIC BLOOD PRESSURE: 134 MMHG | HEART RATE: 85 BPM | RESPIRATION RATE: 16 BRPM | WEIGHT: 156.9 LBS | TEMPERATURE: 96.2 F

## 2024-03-13 DIAGNOSIS — R41.89 COGNITIVE IMPAIRMENT: ICD-10-CM

## 2024-03-13 DIAGNOSIS — K21.9 GASTROESOPHAGEAL REFLUX DISEASE, UNSPECIFIED WHETHER ESOPHAGITIS PRESENT: ICD-10-CM

## 2024-03-13 DIAGNOSIS — R29.6 FALLS FREQUENTLY: ICD-10-CM

## 2024-03-13 DIAGNOSIS — I10 PRIMARY HYPERTENSION: ICD-10-CM

## 2024-03-13 DIAGNOSIS — I48.91 ATRIAL FIBRILLATION, UNSPECIFIED TYPE (MULTI): ICD-10-CM

## 2024-03-13 DIAGNOSIS — R29.6 FALLS FREQUENTLY: Primary | ICD-10-CM

## 2024-03-13 DIAGNOSIS — I50.32 CHRONIC DIASTOLIC HEART FAILURE (MULTI): ICD-10-CM

## 2024-03-13 DIAGNOSIS — E04.1 THYROID NODULE: ICD-10-CM

## 2024-03-13 PROCEDURE — 1160F RVW MEDS BY RX/DR IN RCRD: CPT | Performed by: NURSE PRACTITIONER

## 2024-03-13 PROCEDURE — 1036F TOBACCO NON-USER: CPT | Performed by: NURSE PRACTITIONER

## 2024-03-13 PROCEDURE — 99205 OFFICE O/P NEW HI 60 MIN: CPT | Performed by: NURSE PRACTITIONER

## 2024-03-13 PROCEDURE — 1170F FXNL STATUS ASSESSED: CPT | Performed by: NURSE PRACTITIONER

## 2024-03-13 PROCEDURE — 1159F MED LIST DOCD IN RCRD: CPT | Performed by: NURSE PRACTITIONER

## 2024-03-13 PROCEDURE — 99215 OFFICE O/P EST HI 40 MIN: CPT | Performed by: NURSE PRACTITIONER

## 2024-03-13 ASSESSMENT — ENCOUNTER SYMPTOMS
COUGH: 0
LIGHT-HEADEDNESS: 0
WEAKNESS: 1
DIFFICULTY URINATING: 1
CONFUSION: 1
HEADACHES: 0
FREQUENCY: 1
DEPRESSION: 0
OCCASIONAL FEELINGS OF UNSTEADINESS: 1
TREMORS: 0
PALPITATIONS: 0
DIZZINESS: 0
NERVOUS/ANXIOUS: 0
BACK PAIN: 1
WHEEZING: 0
SHORTNESS OF BREATH: 1
TROUBLE SWALLOWING: 0
SLEEP DISTURBANCE: 1
LOSS OF SENSATION IN FEET: 0
AGITATION: 0

## 2024-03-13 ASSESSMENT — ACTIVITIES OF DAILY LIVING (ADL)
PATIENT'S MEMORY ADEQUATE TO SAFELY COMPLETE DAILY ACTIVITIES?: YES
BATHING: NEEDS ASSISTANCE
PREPARING_MEALS: TOTAL CARE
USING_TELEPHONE: INDEPENDENT
EATING: INDEPENDENT
NEEDS_ASSISTANCE_WITH_FOOD: INDEPENDENT
PILL_BOX_USED: YES
USING_TRANSPORTATION: TOTAL CARE
DOING_HOUSEWORK: TOTAL CARE
MANAGING_FINANCES: TOTAL CARE
GROOMING: NEEDS ASSISTANCE
TOILETING: INDEPENDENT
FEEDING YOURSELF: INDEPENDENT
STILL_DRIVING: NO
TAKING_MEDICATION: NEEDS ASSISTANCE
HEARING - RIGHT EAR: UNABLE TO ASSESS
WALKS IN HOME: INDEPENDENT
DRESSING YOURSELF: INDEPENDENT
ADEQUATE_TO_COMPLETE_ADL: YES
JUDGMENT_ADEQUATE_SAFELY_COMPLETE_DAILY_ACTIVITIES: NO
ASSISTIVE_DEVICE: EYEGLASSES
GROCERY_SHOPPING: TOTAL CARE
HEARING - LEFT EAR: UNABLE TO ASSESS

## 2024-03-13 ASSESSMENT — PAIN SCALES - GENERAL: PAINLEVEL: 6

## 2024-03-13 NOTE — PROGRESS NOTES
Subjective   Patient ID: Anna Magdaleno is a 89 y.o. female who presents for fall 3-4 weeks ago and related issues with balance and energy since.    Identifying Information                              : [id number]           Assessment date: 3/13/2024    Objective     Patient accompanied by:  Daughter, Clarice Mann      History provided by: Daughter, Clarice Mann - patient did not speak at all, to staff or to Clarice during the evaluation    CONCERNS IDENTIFIED BY NURSING AND SOCIAL WORK (Safety Risks, Health Issues, POA not in Chart, etc)     1. Fall in bathroom 30-4 weeks ago, hit back of head - decline in energy, balance, and appetite since     2. Daughter noted great frustration with the coordination of care between physicians, particularly challenges with filling the Spironolactone     3. Patient is left home alone a couple times a week when daughter is working    Social History                           Social History     Socioeconomic History    Marital status:      Spouse name: None    Number of children: 2    Years of education: 17    Highest education level: Bachelor's degree (e.g., BA, AB, BS)   Occupational History    Occupation: [employer]     Comment: Retired for 35 years   Tobacco Use    Smoking status: Never    Smokeless tobacco: Never   Vaping Use    Vaping Use: Never used   Substance and Sexual Activity    Alcohol use: Not Currently    Drug use: Never    Sexual activity: Defer   Other Topics Concern    None   Social History Narrative    None     Social Determinants of Health     Financial Resource Strain: Not on file   Food Insecurity: Not on file   Transportation Needs: Not on file   Physical Activity: Not on file   Stress: Not on file   Social Connections: Not on file   Intimate Partner Violence: Not on file   Housing Stability: Not on file        ENCOUNTER SCREENING RESULTS  GDS and MoCA not completed due language barriers.    NURSING ASSESSMENT    ADL Screening  Patient's  Vision Adequate to Safely Complete Daily Activities: Yes  Patient's Judgment Adequate to Safely Complete Daily Activities: No  Patient's Memory Adequate to Safely Complete Daily Activities: Yes  Patient Able to Express Needs/Desires: Yes  Which is your dominant hand?: Right  Dressing: Independent  Grooming: Needs assistance (Daughter helps a little)  Feeding: Independent  Bathing: Needs assistance (Daughter supervises)  Toileting: Independent  In/Out Bed: Independent  Walks in Home: Independent  Weakness of Legs: None  Weakness of Arms/Hands: None  Hearing - Right Ear: Unable to assess (being assessed for hearing aide)  Hearing - Left Ear: Unable to assess (being assessed for hearing aide)     IADL's  Using Telephone: Independent  Grocery Shopping: Total care  Preparing Meals: Total care  Doing Housework: Total care  Laundry: Total  Taking Medication: Needs assistance (Daughter sets up)  Pill Box Used: Yes  Managing Finances: Total care  Using Transportation: Total care  Still Driving: No  Eating: Independent  Needs Assistance With Food: Independent  Difficulty Chewing or Swallowing: No     Safety Concerns  Safety Concerns: Unsupervised  Safety Concerns Notes:: Patient unsupervised a couple times a week when daughter has to go into work     Nutrition and Exercise  Current Diet: Well Balanced Diet  Adequate Fluid Intake: Yes  Caffeine: Yes (1-2 cups of coffee)  Appetite:: Fair (appetite down since fall a month ago)  Food Consistency:: Regular  Liquids Consistency:: Thin  Changes in Weight?: No  Chewing or Swallowing Problems?: No  Exercise Frequency: No Exercise     SOCIAL WORK ASSESSMENT    Advance Directives/Legal/Financial     DPOA for healthcare: DIRK      DPOA for finances: DIRK       Legal guardian:  DIRK     Living Will: no     On any form of disability? no If so, explain:     ? no  If so, explain:     Significant financial stressors: none noted    Family History      Parent or sibling with neurodegenerative  diagnosis: NA     Extended family members with relevant health history: NA    Living Situation      Type of residence: ranch, downsized recently so going through adjustment     People living in the home: patient, daughter, Clarice, and son-in-law    Supportive Relationships (Informal Support)     Spouse/partner information:   from [place]'s in      Children Information:  Clarice, patient lives with her and her , and another daughter who lives in [place]     Other social supports: family friends    Formal Supports     Engaged community services (Emergency Alert, HHA, MOW, Case Management, Etc.): NA    Mental Health     Sleep: daughter noted patient is sleeping more, 11+ hours at night then returning to recliner to go back to sleep after breakfast     Energy: daughter noted decline in energy since fall a month ago     Mood: decreased range during evaluation, daughter noted patient becomes irritable at times when she is told not to do something due to her physical limitations, for example, getting up on a stool to organize item in a closet.     Affect: flat     Notable Loss/Grief: NA     Self-harm/suicidal thoughts, plan: Present Thoughts     Interests/Hobbies/Activities/Daily Routine: none noted, resistant to exercise/PT intervention referral     History of outpatient psychiatric services: NA     History of inpatient psychiatric services (hospitalization): NA     Medications for mental health past or present: NA     History of addiction services: NA    Assessment/Plan   Impression:  Clarice, patient's daughter, shared patient does not speak English and she would need to speak on her behalf for the evaluation. Ms. Magdaleno did not speak a word to staff or her daughter throughout the evaluation, just sat still and quiet. Clarice shared frustration about not knowing why this appointment was made, saying they were referred after the hospitalization for a fall one month ago. I explained Flavia Lema CNP,  specialization in geriatrics and falls. Clarice explained Ms. Magdaleno fell in the shower 3-4 weeks ago and hit the back of her head and bruised the right side of her body. She noted patient has presented with decreased balance, energy, and appetite since. Cognitive and mood screenings could not be completed due to the language barrier. Clarice noted her mother is fairly well-orientated and doesn't present with confusion. Clarice is managing most IADLs and offering support with some ADLs due to age-related changes. Ms. Magdaleno does not cook or drive. She is left home alone a couple times a week when her daughter has to go into work but Clarice noted she seems to manage fine and her  will stop in to check on her. Advanced directives have not been completed so [facility] packet was provided and importance was explained.    Plan: Review team evaluation results and share information/resources as indicated.

## 2024-03-13 NOTE — Clinical Note
Saw pt in geriatrics clinic for assessment, suspect vascular dementia with her hx of strokes per CT and one stroke was cerebellar. Will recommend therapy again when she comes for her fu visit. Re: cognition, could not do MOCA on her due to language barrier, but trey did report some sx, will check TSH and T4 as thyroid issues can cause memory changes; also noted her persistently elevated Ca++, asymptomatic but will check ionized and a PTH since we are drawing. She sees endocrine in June so may just let them handle any abnormals unless its really off.  And just an FYI-your last note says she is referred to renal but just an FYI, there is no appt listed in system.  Flavia Lema NP

## 2024-03-13 NOTE — PROGRESS NOTES
Subjective   Ms. Magdaleno 89 y.o. year old female is accompanied by: trey Oneil  Consult Requested By:  ER post fall in Feb  Reason for consultation or primary concern: falls, cog impairment    HPI     Per patient and trey Oneil (obtained in addition due to cognition and language)  -recent fall in shower with ER visit [id number], neg CT findings but did find R thyroid nodule; has been more sleepy daily and poor balance since fall, also reduced appetite but no wt loss. Pain since fall in R back, R neck.  Used arnica gel and Tylenol prn but has not needed past few days. Still has today though, rates 5-6.   - follows with cardio for af, dHF, has pacer; being referred to nephro for CKD  - does not like to use walker at home and declines PT  - currently out of spironolactone and cannot get refill from pcp, needs to see cardio and check K+ level.   - pt at home alone for few hours every day while trey goes into work. No ERS and has no cell phone.   - on lasix, has had some incont and she does not want to go out much anymore. Used to go out with friends who speak [place], also had loss of close friend near her age about a year ago. Also declines to go shopping with trey.     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 Has [employer] degree in [employer]  Home environment  home ranch with trey and jayson, recently moved to this new home. Has naother trey lives in [place]  Is dependent or requires assistance in the following BADL: Independent   Is dependent or requires assistance in the following Instrumental ADL: trey cooks, housework, laundry, driving and shopping.   Use of med box: Yes trey fills but pt is aware of her  meds  Advanced Directives on file: needs to get done, SW to give paperwork     Review of Systems   HENT:  Positive for hearing loss and nosebleeds (intermitt no pattern). Negative for dental problem (missing lowers,has dentures, does not use) and trouble swallowing.    Eyes:  Positive for visual disturbance (does not wear her glasses).   Respiratory:  Positive for shortness of breath (perez). Negative for cough and wheezing.    Cardiovascular:  Negative for chest pain, palpitations and leg swelling.   Endocrine: Negative for cold intolerance and heat intolerance.   Genitourinary:  Positive for difficulty urinating (incontinence), frequency and urgency.   Musculoskeletal:  Positive for back pain.   Skin: Negative.    Neurological:  Positive for weakness (generalized). Negative for dizziness, tremors, light-headedness and headaches.   Psychiatric/Behavioral:  Positive for confusion and sleep disturbance (more often daytime somnolence). Negative for agitation. The patient is not nervous/anxious.         Stubborn per trey       Current Outpatient Medications:     atorvastatin (Lipitor) 40 mg tablet, Take 1 tablet (40 mg) by mouth once daily at bedtime., Disp: 90 tablet, Rfl: 1    cholecalciferol, vitamin D3, (VITAMIN D3 ORAL), Take 1.25 mg by mouth 2 times a week. Take 1.25 mg (50,000 international units) by mouth twice weekly., Disp: , Rfl:     Eliquis 5 mg tablet, TAKE ONE TABLET BY MOUTH TWO TIMES A DAY, Disp: 180 tablet, Rfl: 1    furosemide (Lasix) 40 mg tablet, Take 1 tablet (40 mg) by mouth once daily., Disp: 90 tablet, Rfl: 1    losartan (Cozaar) 50 mg tablet, Take 1 tablet (50 mg) by mouth once daily., Disp: 90 tablet, Rfl: 1    metoprolol succinate XL (Toprol-XL) 50 mg 24 hr tablet, Take 1 tablet (50 mg) by mouth once daily., Disp: 90 tablet, Rfl: 1    omeprazole (PriLOSEC) 20 mg DR capsule, Take 1 capsule (20 mg) by mouth once daily., Disp: , Rfl:     POTASSIUM CHLORIDE ORAL, Take 20 mEq by mouth once daily.,  Disp: , Rfl:     spironolactone (Aldactone) 25 mg tablet, Take 1 tablet (25 mg) by mouth once daily., Disp: 90 tablet, Rfl: 1    UNABLE TO FIND, Take 1,000 mg by mouth once daily. Take Bee Pollen 1000 mg by mouth once daily., Disp: , Rfl:      Objective   There were no vitals taken for this visit.  Lab on 02/15/2024   Component Date Value Ref Range Status    Glucose 02/15/2024 87  74 - 99 mg/dL Final    Sodium 02/15/2024 137  136 - 145 mmol/L Final    Potassium 02/15/2024 4.3  3.5 - 5.3 mmol/L Final    Chloride 02/15/2024 101  98 - 107 mmol/L Final    Bicarbonate 02/15/2024 25  21 - 32 mmol/L Final    Anion Gap 02/15/2024 15  10 - 20 mmol/L Final    Urea Nitrogen 02/15/2024 33 (H)  6 - 23 mg/dL Final    Creatinine 02/15/2024 0.94  0.50 - 1.05 mg/dL Final    eGFR 02/15/2024 57 (L)  >60 mL/min/1.73m*2 Final    Calcium 02/15/2024 11.1 (H)  8.6 - 10.6 mg/dL Final    Cholesterol 02/15/2024 92  0 - 199 mg/dL Final    HDL-Cholesterol 02/15/2024 31.2  mg/dL Final    Cholesterol/HDL Ratio 02/15/2024 2.9   Final    LDL Calculated 02/15/2024 43  mg/dL Final    VLDL 02/15/2024 18  0 - 40 mg/dL Final    Triglycerides 02/15/2024 88  0 - 149 mg/dL Final    Non HDL Cholesterol 02/15/2024 61  0 - 149 mg/dL Final    Hemoglobin A1C 02/15/2024 5.4  see below % Final    Estimated Average Glucose 02/15/2024 108  Not Established mg/dL Final    WBC 02/15/2024 5.3  4.4 - 11.3 x10*3/uL Final    nRBC 02/15/2024 0.0  0.0 - 0.0 /100 WBCs Final    RBC 02/15/2024 4.42  4.00 - 5.20 x10*6/uL Final    Hemoglobin 02/15/2024 13.8  12.0 - 16.0 g/dL Final    Hematocrit 02/15/2024 41.0  36.0 - 46.0 % Final    MCV 02/15/2024 93  80 - 100 fL Final    MCH 02/15/2024 31.2  26.0 - 34.0 pg Final    MCHC 02/15/2024 33.7  32.0 - 36.0 g/dL Final    RDW 02/15/2024 15.1 (H)  11.5 - 14.5 % Final    Platelets 02/15/2024 178  150 - 450 x10*3/uL Final      [id number] head CT: periventricular hypodensities consistent with  mild small vessel disease. remote infarct of the  right posterior frontal/parietal region along the cerebral convexity. There is a remote right right parietal infarct. There is a small remote right cerebellar infarct peripherally   Cervical and thoracic CT: There is degenerative disc disease. There is moderate to marked anterior osteophytic spurring at all levels. There is mild posterior osteophytic spurring at C5-6 and moderate posterior osteophytic spurring at C6-7. Cardiomegaly, mild. Cholelithiasis but no acute issues, small hiatal hernia. Disc space narrowing in thoracic spine, mild scoliosis.     Physical Exam      MoCA: declined-lang barrier /30  PHQ9:  unable lang barrier /27  NPI: + apathy  AD8 : 3        Assessment/Plan   1. Falls frequently  - declined PT and does not like to use her walker  - per CT hx of cerebellar stroke  - would reiterate recs for therapy with hx of cerebellar stroke, may benefit from balance exercises  - do ortho signs next appt (no VS recorded for this appt) as pt on several cardio meds     2. Cognitive impairment  - unable to do moca due to language barrier, trey expresses on AD8 repetitiveness, difficulty in learning new things and less interest in hobbies, etc; also notes apathy on the npi.   - suspect vascular dementia with hx of strokes per CT  - has had reduced appetite but no wt loss, could consider trying Aricept but monitor closely    3. Thyroid nodule  - being evaluated, to see endocrine in June 2024  - TSH and T4 not recently checked, will ask pt to obtain to rule out treatable cognition changes  - also noted persistently elevated Ca++, will check ionized Ca++, PTH. Of note, pt also with CKD.     4. Atrial fibrillation, unspecified type (CMS/HCC)  5. Chronic diastolic heart failure (CMS/HCC)  6. Primary hypertension  - follows with cardiology     7. Gastroesophageal reflux disease, unspecified whether esophagitis present  - on PPI    8. CKD  - has been referred to renal per PCP notes but no appt in system. Will update PCP  who indicated pt had been referred.     9. Health maintenance  - no DXA found in system, if not done would obtain to assess for treatable osteoporosis that could cause painful fractures  - no breast imaging found, would not do at this point as pt will likely have difficulty with positioning and cognitive impairment may make it difficult to explain test; if any abnormalities found on a breast exam, could then evaluate further.   - Immunizations: do not see evidence of vaccines in system; would rec obtaining PCV 20, RSV, Covid/boosters, Shingrix and continue to get flu vaccines yearly.   - Home safety: may benefit from ERS in home when trey is away at work  - Adv Directives: packet given to trey by SW    Dispo: return for fu in 4-6 weeks. Will check ortho sx at that time; will ask staff to call and have pt go for labs.     NICOLASA Ricardo-CNP

## 2024-03-13 NOTE — PATIENT INSTRUCTIONS
Thank you for meeting with me today. We discussed the following:   It is likely that you have some brain challenges especially with new things. Give instructions in bite size pieces to help with this. Delayed recall may also be impaired, so try to keep routine so that she does not have to remember so much.     In addition, here are some general guidelines on brain health, pain control and sleep.   General brain health guidelines:  - Make sure your medical conditions are well controlled (e.g., high blood pressure, high cholesterol, diabetes, sleep apnea etc)  - Do not smoke or chew tobacco  - Limiit alcohol use to no more than 1 alcoholic beverage per day   - Address any sensory deficits (e.g., proper glasses for poor eyesight, hearing aids for hearing loss)  - Use a weekly pill box  - Eat a heart healthy diet (fruits, vegetables, lean meats, fatty fish, whole grains. Limit processed foods)  - Exercise or walk. Gradually increase to the goal of 5 days per week, 30 minutes at a time  - Try to get at least 7 hours of quality sleep per night  - Keep yourself mentally active daily by reading, playing cards, doing word searches, puzzles, etc.  - Challenge your brain with new cognitive tasks (new hobby, crafts, take a class, learn a language)  - Stay socially active by being part of a group or organization    General pain control guidelines  - try to stay ahead of pain by taking your medications sooner rather than later  - Tyelnol is generally a safe medication to take for pain. Try 1000 mg twice a day with a third dose in the middle of the day if needed. Stay away from formulations that have Benadryl or diphenhydramine in them.   - Lidocaine gel or patch may help to relieve pain. 4% strength is over the counter.  - Voltaren gel 1% may be helpful in relieving pain. It is available over the counter. For the back use 4G 3-4x a day.   - avoid Nonsteroidal Anti-inflammatories (Nsaids) such as Motrin (ibuprofen), Aleve  (naproxen) unless specifically recommended by your provider;  these can cause gastrointestinal and kidney problems.   - Use heat or cold as needed to help with pain.   - Distraction can be helpful.     General sleep guidelines  - Avoid over the counter sleep preparations with diphenhydramine or Benadryl. This medicine can cause confusion and increase risk of falls.   - Do not use alcohol to go to sleep.   - Melatonin is generally safe to try, start with 1-3 mg a couple of hours before sleep.   - Avoid all caffeine after 12 noon. Look for hidden sources such as chocolate.   - Try an herbal tea like chamomile or Sleepytime before bed.  - Turn off the TV or set a timer so it goes off.   - Establish a bedtime routine to tell your body it is time to sleep. It can be some relaxing music, reading a book, taking a shower, prayer/meditation, etc.       Please follow up with us in return to clinic: 4-6 weeksfor 1 hour summary visit with  and me.

## 2024-03-18 ENCOUNTER — APPOINTMENT (OUTPATIENT)
Dept: CARDIOLOGY | Facility: CLINIC | Age: 89
End: 2024-03-18
Payer: COMMERCIAL

## 2024-03-25 DIAGNOSIS — R41.89 COGNITIVE DEFICITS: Primary | ICD-10-CM

## 2024-03-25 DIAGNOSIS — R94.6 ABNORMAL RESULTS OF THYROID FUNCTION STUDIES: ICD-10-CM

## 2024-03-25 DIAGNOSIS — E83.52 HYPERCALCEMIA: ICD-10-CM

## 2024-04-01 ENCOUNTER — LAB (OUTPATIENT)
Dept: LAB | Facility: LAB | Age: 89
End: 2024-04-01
Payer: COMMERCIAL

## 2024-04-01 DIAGNOSIS — E83.52 HYPERCALCEMIA: ICD-10-CM

## 2024-04-01 DIAGNOSIS — R41.89 COGNITIVE DEFICITS: ICD-10-CM

## 2024-04-01 DIAGNOSIS — R94.6 ABNORMAL RESULTS OF THYROID FUNCTION STUDIES: ICD-10-CM

## 2024-04-01 LAB
CA-I BLD-SCNC: 1.6 MMOL/L (ref 1.1–1.33)
TSH SERPL-ACNC: 0.79 MIU/L (ref 0.44–3.98)

## 2024-04-01 PROCEDURE — 83970 ASSAY OF PARATHORMONE: CPT

## 2024-04-01 PROCEDURE — 84443 ASSAY THYROID STIM HORMONE: CPT

## 2024-04-01 PROCEDURE — 36415 COLL VENOUS BLD VENIPUNCTURE: CPT

## 2024-04-01 PROCEDURE — 82330 ASSAY OF CALCIUM: CPT

## 2024-04-02 LAB — PTH-INTACT SERPL-MCNC: 71.6 PG/ML (ref 18.5–88)

## 2024-04-03 ENCOUNTER — HOSPITAL ENCOUNTER (OUTPATIENT)
Dept: CARDIOLOGY | Facility: HOSPITAL | Age: 89
Discharge: HOME | End: 2024-04-03
Payer: COMMERCIAL

## 2024-04-03 DIAGNOSIS — Z95.0 PRESENCE OF CARDIAC PACEMAKER: ICD-10-CM

## 2024-04-03 DIAGNOSIS — I48.0 PAROXYSMAL ATRIAL FIBRILLATION (MULTI): ICD-10-CM

## 2024-04-03 PROCEDURE — 93296 REM INTERROG EVL PM/IDS: CPT

## 2024-04-03 NOTE — RESULT ENCOUNTER NOTE
Please call the patient regarding her abnormal result. Her calcium level is a little high. She has appt with endocrine in June so make sure to go to that and also her PCP had wanted her to see nephrology but I don't see any appt, she should make an appt with them. If she is taking any calcium supplements please hold them. I will follow up with her PCP about any further actions. Thanks.

## 2024-04-11 DIAGNOSIS — I50.32 CHRONIC DIASTOLIC CONGESTIVE HEART FAILURE (MULTI): Primary | ICD-10-CM

## 2024-04-11 RX ORDER — POTASSIUM CHLORIDE 20 MEQ/1
20 TABLET, EXTENDED RELEASE ORAL DAILY
Qty: 90 TABLET | Refills: 1 | Status: SHIPPED | OUTPATIENT
Start: 2024-04-11 | End: 2024-10-08

## 2024-04-11 NOTE — TELEPHONE ENCOUNTER
----- Message from Maxim Landa sent at 4/9/2024  2:11 PM EDT -----  Regarding: Med Refill  Patient called in and would like her POTASSIUM CHLORIDE ORAL refilled and sent to [facility] in [place] please and thank you

## 2024-04-26 NOTE — PROGRESS NOTES
Subjective   Ms. Magdaleno is 89 y.o. year old female and here for f/u of   Chief Complaint   Patient presents with    Dementia    Summary visit     Here with trey.  Virtual or Telephone Consent    An interactive audio and video telecommunication system which permits real time communications between the patient (at the originating site) and provider (at the distant site) was utilized to provide this telehealth service.   Verbal consent was requested and obtained from Anna Magdaleno /trey Garcia this date, 04/29/24 for a telehealth visit.      Last visit- [id number]   Per pt discussion/summary: note reviewed    Interim events:none    HPI   Per patient and caregiver (obtained in addition due to lang barrier and dementia)  - pt has incont of urine, limiting activity, occurred in March, but is happening more frequently toño on days with lasix. No dysuria.       Review of Systems   Respiratory:  Negative for shortness of breath.    Cardiovascular:  Negative for chest pain.   Genitourinary:  Positive for frequency and urgency. Negative for difficulty urinating and dysuria.   Neurological:         No falls       Objective   There were no vitals taken for this visit.  Lab on 04/01/2024   Component Date Value Ref Range Status    POCT Calcium, Ionized 04/01/2024 1.60 (H)  1.1 - 1.33 mmol/L Final    Parathyroid Hormone, Intact 04/01/2024 71.6  18.5 - 88.0 pg/mL Final    Thyroid Stimulating Hormone 04/01/2024 0.79  0.44 - 3.98 mIU/L Final   Lab on 02/15/2024   Component Date Value Ref Range Status    Glucose 02/15/2024 87  74 - 99 mg/dL Final    Sodium 02/15/2024 137  136 - 145 mmol/L Final    Potassium 02/15/2024 4.3  3.5 - 5.3 mmol/L Final    Chloride 02/15/2024 101  98 - 107 mmol/L Final    Bicarbonate 02/15/2024 25  21 - 32 mmol/L Final    Anion Gap 02/15/2024 15  10 - 20 mmol/L Final    Urea Nitrogen 02/15/2024 33 (H)  6 - 23 mg/dL Final    Creatinine 02/15/2024 0.94  0.50 - 1.05 mg/dL Final    eGFR 02/15/2024 57 (L)  >60  mL/min/1.73m*2 Final    Calcium 02/15/2024 11.1 (H)  8.6 - 10.6 mg/dL Final    Cholesterol 02/15/2024 92  0 - 199 mg/dL Final    HDL-Cholesterol 02/15/2024 31.2  mg/dL Final    Cholesterol/HDL Ratio 02/15/2024 2.9   Final    LDL Calculated 02/15/2024 43  mg/dL Final    VLDL 02/15/2024 18  0 - 40 mg/dL Final    Triglycerides 02/15/2024 88  0 - 149 mg/dL Final    Non HDL Cholesterol 02/15/2024 61  0 - 149 mg/dL Final    Hemoglobin A1C 02/15/2024 5.4  see below % Final    Estimated Average Glucose 02/15/2024 108  Not Established mg/dL Final    WBC 02/15/2024 5.3  4.4 - 11.3 x10*3/uL Final    nRBC 02/15/2024 0.0  0.0 - 0.0 /100 WBCs Final    RBC 02/15/2024 4.42  4.00 - 5.20 x10*6/uL Final    Hemoglobin 02/15/2024 13.8  12.0 - 16.0 g/dL Final    Hematocrit 02/15/2024 41.0  36.0 - 46.0 % Final    MCV 02/15/2024 93  80 - 100 fL Final    MCH 02/15/2024 31.2  26.0 - 34.0 pg Final    MCHC 02/15/2024 33.7  32.0 - 36.0 g/dL Final    RDW 02/15/2024 15.1 (H)  11.5 - 14.5 % Final    Platelets 02/15/2024 178  150 - 450 x10*3/uL Final         Physical Exam  Constitutional:       General: She is not in acute distress.  HENT:      Head: Normocephalic and atraumatic.      Nose: Nose normal.   Eyes:      Conjunctiva/sclera: Conjunctivae normal.   Pulmonary:      Effort: Pulmonary effort is normal.   Musculoskeletal:      Cervical back: Normal range of motion.   Skin:     General: Skin is dry.   Neurological:      Mental Status: She is alert.      limited due to AV only       Assessment/Plan   Falls- needs to do therapy exercises for balance, stroke was cerebellar    VaD- consider aricept but would need to watch wt;trey will consider     Thyroid nodule -fu with endocrine    Elevated Ca++ in setting of CKD, pth is wnl-avoid thiazide diuretics, CA supps & high calcium diet -needs to be monitored     Incont  - on lasix 40 mg every other day, suggest trial of 20 mg daily and keep track of Bps.     HM  - do not see evidence of vaccines in  system; would rec obtaining PCV 20, RSV, Covid/boosters, Shingrix and continue to get flu vaccines yearly.   - Adv directives need completed  - dexa, trey thinks may have been done [facility] in past, would like to have Dr. Lester order      Dispo: trey to decide on fu with geriatrics in 6 mos for fu on dementia  Pt education info given on delirium, elevated Ca and Aricept, dementia

## 2024-04-29 ENCOUNTER — TELEMEDICINE (OUTPATIENT)
Dept: GERIATRIC MEDICINE | Facility: CLINIC | Age: 89
End: 2024-04-29
Payer: COMMERCIAL

## 2024-04-29 DIAGNOSIS — E83.52 HYPERCALCEMIA: ICD-10-CM

## 2024-04-29 DIAGNOSIS — N18.4 CKD (CHRONIC KIDNEY DISEASE) STAGE 4, GFR 15-29 ML/MIN (MULTI): ICD-10-CM

## 2024-04-29 DIAGNOSIS — R41.89 COGNITIVE IMPAIRMENT: ICD-10-CM

## 2024-04-29 DIAGNOSIS — F01.50 VASCULAR DEMENTIA, UNSPECIFIED DEMENTIA SEVERITY, UNSPECIFIED WHETHER BEHAVIORAL, PSYCHOTIC, OR MOOD DISTURBANCE OR ANXIETY (MULTI): ICD-10-CM

## 2024-04-29 DIAGNOSIS — R29.6 FALLS FREQUENTLY: Primary | ICD-10-CM

## 2024-04-29 DIAGNOSIS — I10 PRIMARY HYPERTENSION: ICD-10-CM

## 2024-04-29 PROCEDURE — 1036F TOBACCO NON-USER: CPT | Performed by: NURSE PRACTITIONER

## 2024-04-29 PROCEDURE — 1160F RVW MEDS BY RX/DR IN RCRD: CPT | Performed by: NURSE PRACTITIONER

## 2024-04-29 PROCEDURE — 99215 OFFICE O/P EST HI 40 MIN: CPT | Performed by: NURSE PRACTITIONER

## 2024-04-29 PROCEDURE — 1159F MED LIST DOCD IN RCRD: CPT | Performed by: NURSE PRACTITIONER

## 2024-04-29 ASSESSMENT — ENCOUNTER SYMPTOMS
DIFFICULTY URINATING: 0
DYSURIA: 0
FREQUENCY: 1
SHORTNESS OF BREATH: 0

## 2024-04-29 NOTE — PATIENT INSTRUCTIONS
Thank you for meeting with me today. We discussed the following:     Please review the hand outs attached.   We can follow for dementia as a specialty service, if desired, I would recommend return in 6 months.   Try using the lasix at 20 mg every day and see if helps the incontinence and/or changes the bp.     In addition, here are some general guidelines on brain health, pain control and sleep.   General brain health guidelines:  - Make sure your medical conditions are well controlled (e.g., high blood pressure, high cholesterol, diabetes, sleep apnea etc)  - Do not smoke or chew tobacco  - Limiit alcohol use to no more than 1 alcoholic beverage per day   - Address any sensory deficits (e.g., proper glasses for poor eyesight, hearing aids for hearing loss)  - Use a weekly pill box  - Eat a heart healthy diet (fruits, vegetables, lean meats, fatty fish, whole grains. Limit processed foods)  - Exercise or walk. Gradually increase to the goal of 5 days per week, 30 minutes at a time  - Try to get at least 7 hours of quality sleep per night  - Keep yourself mentally active daily by reading, playing cards, doing word searches, puzzles, etc.  - Challenge your brain with new cognitive tasks (new hobby, crafts, take a class, learn a language)  - Stay socially active by being part of a group or organization    General pain control guidelines  - try to stay ahead of pain by taking your medications sooner rather than later  - Tyelnol is generally a safe medication to take for pain. A general dose is 1000 mg every 6-8 hours; do not exceed 3000 mg or 3 doses in a day. Stay away from formulations that have Benadryl or diphenhydramine in them.   - Lidocaine gel or patch may help to relieve pain. 4% strength is over the counter.  - Voltaren gel 1% may be helpful in relieving pain. It is available over the counter.   - avoid Nonsteroidal Anti-inflammatories (Nsaids) such as Motrin (ibuprofen), Aleve (naproxen) unless specifically  recommended by your provider;  these can cause gastrointestinal and kidney problems.   - Use heat or cold as needed to help with pain.   - Distraction can be helpful.     General sleep guidelines  - Avoid over the counter sleep preparations with diphenhydramine or Benadryl. This medicine can cause confusion and increase risk of falls.   - Do not use alcohol to go to sleep.   - Melatonin is generally safe to try, start with 1-3 mg a couple of hours before sleep.   - Avoid all caffeine after 12 noon. Look for hidden sources such as chocolate.   - Try an herbal tea like chamomile or Sleepytime before bed.  - Turn off the TV or set a timer so it goes off.   - Establish a bedtime routine to tell your body it is time to sleep. It can be some relaxing music, reading a book, taking a shower, prayer/meditation, etc.     For vaccines, I would recommend getting the PCV 20 for Pneumonia, the RSV to protect againest respiratory syncytial virus , Shingrix (2 step vaccine to prevent shingles). Also, the flu shot is due each fall. Covid shots and boosters can also be caught up at the pharmacy. All can be obtained at local pharmacy.   Please follow up with us in return to clinic: 6 months if desired

## 2024-04-29 NOTE — Clinical Note
ALLISON Forbes, I did the geriatric follow up visit with Ms. Magdaleno and her daughter today. My recs are in the note, she has not had a recent DXA, trey said she would follow up with you about it. I also suggested changing her 40 mg lasix every other day to 20 mg every day to see if it helps her incontinence.

## 2024-06-27 ENCOUNTER — APPOINTMENT (OUTPATIENT)
Dept: ENDOCRINOLOGY | Facility: CLINIC | Age: 89
End: 2024-06-27
Payer: COMMERCIAL

## 2024-06-27 VITALS
SYSTOLIC BLOOD PRESSURE: 95 MMHG | WEIGHT: 159 LBS | RESPIRATION RATE: 16 BRPM | TEMPERATURE: 96.6 F | HEIGHT: 60 IN | DIASTOLIC BLOOD PRESSURE: 59 MMHG | BODY MASS INDEX: 31.22 KG/M2 | HEART RATE: 60 BPM

## 2024-06-27 DIAGNOSIS — E04.1 THYROID NODULE: ICD-10-CM

## 2024-06-27 PROCEDURE — 3078F DIAST BP <80 MM HG: CPT | Performed by: STUDENT IN AN ORGANIZED HEALTH CARE EDUCATION/TRAINING PROGRAM

## 2024-06-27 PROCEDURE — 3074F SYST BP LT 130 MM HG: CPT | Performed by: STUDENT IN AN ORGANIZED HEALTH CARE EDUCATION/TRAINING PROGRAM

## 2024-06-27 PROCEDURE — 1159F MED LIST DOCD IN RCRD: CPT | Performed by: STUDENT IN AN ORGANIZED HEALTH CARE EDUCATION/TRAINING PROGRAM

## 2024-06-27 PROCEDURE — 1036F TOBACCO NON-USER: CPT | Performed by: STUDENT IN AN ORGANIZED HEALTH CARE EDUCATION/TRAINING PROGRAM

## 2024-06-27 PROCEDURE — 99204 OFFICE O/P NEW MOD 45 MIN: CPT | Performed by: STUDENT IN AN ORGANIZED HEALTH CARE EDUCATION/TRAINING PROGRAM

## 2024-06-27 NOTE — PROGRESS NOTES
Endocrinology  6/27/2024    History of Present Illness   Anna Magdaleno is a 89 y.o. year old female with medical history of HTN, HFpEF, TIA s/p CEA, AF (on DOAC), symptomatic bradycardia s/p PPM, here for thyroid nodule.     Here today with her daughter, Adwoa.   Adwoa is a physician. 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   [facility] is now [facility]     Patient speaks primarily Greenlandic   Her main complaint today is back pain.     After a fall at home, she had CT chest and spine, which showed a thyroid nodules.     No prior thyroid issues.   Never on thyroid medication.     Neck pain: Denies    Difficulty breathing: Chronic - due to heart failure    Difficulty swallowing: Denies    Change in voice: Denies      Weight changes: Stable    Heat or cold intolerance: Denies    Palpitations: Denies    Tremor: Denies   Bowel changes: Denies    Difficulty sleeping: Denies    Menstrual cycles: Post-menopausal     History of radiation to the head/neck/chest: Denies    Amiodarone use: Denies    Biotin use: Denies      Review of Systems   General: Denies fever or chills   Head: Denies headache or vision changes   Neck: Denies tenderness or lumps   Cardiac: Denies chest pain   Respiratory: Denies shortness of breath or cough   GI: Denies abdominal pain, nausea, or vomiting   Extremities: Denies swelling   Skin: Denies rash       Medications     Current Outpatient Medications   Medication Instructions    atorvastatin (LIPITOR) 40 mg, oral, Nightly    cholecalciferol, vitamin D3, (VITAMIN D3 ORAL) 1.25 mg, oral, 2 times weekly, Take 1.25 mg (50,000 international units) by mouth twice weekly.     Eliquis 5 mg, oral, 2 times daily    furosemide (LASIX) 40 mg, oral, Daily    losartan (COZAAR) 50 mg, oral, Daily    metoprolol succinate XL (TOPROL-XL) 50 mg, oral, Daily    omeprazole (PRILOSEC) 20 mg, oral, Daily    potassium chloride CR 20 mEq ER tablet 20 mEq, oral, Daily    spironolactone  (ALDACTONE) 25 mg, oral, Daily    UNABLE TO FIND 1,000 mg, oral, Daily, Take Bee Pollen 1000 mg by mouth once daily.           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    Cerebral infarction due to embolism of right middle cerebral artery    Other reduced mobility     Declining mobility    Other specified cough     Cough due to ACE inhibitor    Pain in right ankle and joints of right foot     Acute right ankle pain    Personal history of malignant neoplasm of breast     History of malignant neoplasm of breast    Personal history of other diseases of the musculoskeletal system and connective tissue     History of low back pain    Personal history of other specified conditions     History of dizziness    Personal history of other specified conditions     History of headache    Personal history of transient ischemic attack (TIA), and cerebral infarction without residual deficits     History of cerebrovascular accident    Sick sinus syndrome (Multi)     Sick sinus syndrome    Unspecified fall, initial encounter     Accidental fall    Unspecified osteoarthritis, unspecified site     Degenerative joint disease       Past Surgical History:   Procedure Laterality Date    APPENDECTOMY  12/13/2017    Appendectomy    CARDIAC PACEMAKER PLACEMENT  08/12/2021    Pacemaker Permanent Placement    CT ANGIO NECK  9/1/2020    CT NECK ANGIO W AND WO IV CONTRAST 9/1/2020 [facility]ICAL [facility]GAC[facility]    CT HEAD ANGIO W AND WO IV CONTRAST  9/1/2020    CT HEAD ANGIO W AND WO IV CONTRAST 9/1/2020 [facility] CLINICAL LEGAC[facility]    OTHER SURGICAL HISTORY  08/12/2021    Internal carotid thromboendarterectomy    OTHER SURGICAL HISTORY  10/29/2020    Thrombectomy    TONSILLECTOMY  12/13/2017    Tonsillectomy       Family History   Problem Relation Name Age of Onset    Colon cancer  Mother      Unexplained death Father           in  when patient a young child        Allergies   Allergen Reactions    Ace Inhibitors Unknown        Social history:   - Lives with daughter        Physical Exam   BP 95/59 (BP Location: Left arm, Patient Position: Sitting)   Pulse 60   Temp 35.9 °C (96.6 °F)   Resp 16   Ht 1.524 m (5')   Wt 72.1 kg (159 lb)   BMI 31.05 kg/m²   General: Well appearing, no acute distress  Neck: Soft, nontender, no lymphadenopathy, thyroid normal in size   Lungs: Breathing comfortably on room air   Extremities: Warm, no edema  Skin: No rashes    Labs and Imaging     Lab Results   Component Value Date    TSH 0.79 2024    FREET4 1.35 (H) 2022     STUDY:  CT CERVICAL SPINE WO IV CONTRAST;  2024 12:41 pm  There is partial substernal extension of the right lobe of the  thyroid. There is a 1.3 cm hypodense nodule in the lower pole of the  right lobe of the thyroid. There is a 1.6 cm hyperdense nodule  projecting off the lower pole of the right lobe of the thyroid.  Assessment and Plan   Anna Magdaleno is a 89 y.o. year old female with medical history of HTN, HFpEF, TIA s/p CEA, AF (on DOAC), symptomatic bradycardia s/p PPM, here for thyroid nodule incidentally discovered on CT cervical spine 2024. On review of records, there is mention of bilateral thyroid nodules on CTA neck and carotid ultrasound from . Discussed with daughter, who is also a physician, that the best imaging modality for thyroid nodules is ultrasonography. She would like to get a thyroid ultrasound. Per daughter, it is unlikely, given the patients age, that they would proceed with FNA.     # Thyroid nodules:  - Thyroid ultrasound     RTC: Pending results      Blanca Chavez DO   Endocrinology

## 2024-08-14 ENCOUNTER — APPOINTMENT (OUTPATIENT)
Dept: OTOLARYNGOLOGY | Facility: CLINIC | Age: 89
End: 2024-08-14
Payer: COMMERCIAL

## 2024-08-19 ENCOUNTER — OFFICE VISIT (OUTPATIENT)
Dept: CARDIOLOGY | Facility: HOSPITAL | Age: 89
End: 2024-08-19
Payer: COMMERCIAL

## 2024-08-19 ENCOUNTER — APPOINTMENT (OUTPATIENT)
Dept: CARDIOLOGY | Facility: CLINIC | Age: 89
End: 2024-08-19
Payer: COMMERCIAL

## 2024-08-19 VITALS
HEART RATE: 62 BPM | OXYGEN SATURATION: 96 % | BODY MASS INDEX: 11.97 KG/M2 | DIASTOLIC BLOOD PRESSURE: 62 MMHG | RESPIRATION RATE: 20 BRPM | WEIGHT: 61.3 LBS | SYSTOLIC BLOOD PRESSURE: 99 MMHG

## 2024-08-19 DIAGNOSIS — I50.32 CHRONIC DIASTOLIC CONGESTIVE HEART FAILURE (MULTI): Primary | ICD-10-CM

## 2024-08-19 DIAGNOSIS — I10 BENIGN ESSENTIAL HYPERTENSION: ICD-10-CM

## 2024-08-19 DIAGNOSIS — I65.21 ARTERIOSCLEROSIS OF RIGHT CAROTID ARTERY: ICD-10-CM

## 2024-08-19 DIAGNOSIS — I48.20 CHRONIC ATRIAL FIBRILLATION (MULTI): ICD-10-CM

## 2024-08-19 DIAGNOSIS — I48.0 PAROXYSMAL ATRIAL FIBRILLATION (MULTI): ICD-10-CM

## 2024-08-19 PROCEDURE — 1036F TOBACCO NON-USER: CPT | Performed by: NURSE PRACTITIONER

## 2024-08-19 PROCEDURE — 3074F SYST BP LT 130 MM HG: CPT | Performed by: NURSE PRACTITIONER

## 2024-08-19 PROCEDURE — 3078F DIAST BP <80 MM HG: CPT | Performed by: NURSE PRACTITIONER

## 2024-08-19 PROCEDURE — 99213 OFFICE O/P EST LOW 20 MIN: CPT | Performed by: NURSE PRACTITIONER

## 2024-08-19 PROCEDURE — 1159F MED LIST DOCD IN RCRD: CPT | Performed by: NURSE PRACTITIONER

## 2024-08-19 RX ORDER — FUROSEMIDE 40 MG/1
20 TABLET ORAL DAILY
Qty: 90 TABLET | Refills: 1 | Status: SHIPPED | OUTPATIENT
Start: 2024-08-19 | End: 2025-02-15

## 2024-08-19 SDOH — ECONOMIC STABILITY: FOOD INSECURITY: WITHIN THE PAST 12 MONTHS, YOU WORRIED THAT YOUR FOOD WOULD RUN OUT BEFORE YOU GOT MONEY TO BUY MORE.: NEVER TRUE

## 2024-08-19 SDOH — ECONOMIC STABILITY: FOOD INSECURITY: WITHIN THE PAST 12 MONTHS, THE FOOD YOU BOUGHT JUST DIDN'T LAST AND YOU DIDN'T HAVE MONEY TO GET MORE.: NEVER TRUE

## 2024-08-19 ASSESSMENT — ENCOUNTER SYMPTOMS
OCCASIONAL FEELINGS OF UNSTEADINESS: 1
BLOOD IN STOOL: 0
FEVER: 0
LOSS OF SENSATION IN FEET: 0
CONFUSION: 0
EYES NEGATIVE: 1
SHORTNESS OF BREATH: 0
CHILLS: 0
PALPITATIONS: 0
WHEEZING: 0
HEMATURIA: 0
ABDOMINAL DISTENTION: 0
CHEST TIGHTNESS: 0
COUGH: 0
DEPRESSION: 1
LIGHT-HEADEDNESS: 0
ACTIVITY CHANGE: 0
WEAKNESS: 0

## 2024-08-19 ASSESSMENT — PATIENT HEALTH QUESTIONNAIRE - PHQ9
10. IF YOU CHECKED OFF ANY PROBLEMS, HOW DIFFICULT HAVE THESE PROBLEMS MADE IT FOR YOU TO DO YOUR WORK, TAKE CARE OF THINGS AT HOME, OR GET ALONG WITH OTHER PEOPLE: NOT DIFFICULT AT ALL
2. FEELING DOWN, DEPRESSED OR HOPELESS: SEVERAL DAYS
SUM OF ALL RESPONSES TO PHQ9 QUESTIONS 1 AND 2: 1
1. LITTLE INTEREST OR PLEASURE IN DOING THINGS: NOT AT ALL

## 2024-08-19 ASSESSMENT — COLUMBIA-SUICIDE SEVERITY RATING SCALE - C-SSRS
2. HAVE YOU ACTUALLY HAD ANY THOUGHTS OF KILLING YOURSELF?: NO
6. HAVE YOU EVER DONE ANYTHING, STARTED TO DO ANYTHING, OR PREPARED TO DO ANYTHING TO END YOUR LIFE?: NO
1. IN THE PAST MONTH, HAVE YOU WISHED YOU WERE DEAD OR WISHED YOU COULD GO TO SLEEP AND NOT WAKE UP?: NO

## 2024-08-19 NOTE — PATIENT INSTRUCTIONS
Thank you for coming in today.  If you have any questions you may contact the office Monday through Friday at [phone] or on week ends at [phone].    You may take the Furosemide 20 mg daily but increase to 40 mg  if weight gain, swelling or increased shortness of breath.      Please get lab work completed.    Please get echocardiogram.     Please follow  a 2 GM sodium diet and limit fluid intake to 2 liters per day or 8 servings ( serving size = 8 oz. = 1 cup = 240 ml) per day.   Please avoid processed meat products (luncheon meats, sausages, jaquez, hot dogs for example) eat 4 servings of vegetables and 1-2 whole servings of whole fruits per day.   Please weigh daily and call [phone] for weight gain of 3 pounds in 24 hours or 5 pounds or if you experience increased swelling or shortness of breath.         Follow up:   4 months.     Please be sure to follow up with your cardiologist at [facility] once every year. Call [phone] to schedule appointment if you do not have a follow up appointment scheduled already.

## 2024-08-19 NOTE — PROGRESS NOTES
Subjective   Patient ID: Anna Magdaleno is a 89 y.o. female who presents for follow-up of congestive heart failure.     Current Outpatient Medications:     atorvastatin (Lipitor) 40 mg tablet, Take 1 tablet (40 mg) by mouth once daily at bedtime., Disp: 90 tablet, Rfl: 1    cholecalciferol, vitamin D3, (VITAMIN D3 ORAL), Take 1.25 mg by mouth 2 times a week. Take 1.25 mg (50,000 international units) by mouth twice weekly., Disp: , Rfl:     Eliquis 5 mg tablet, TAKE ONE TABLET BY MOUTH TWO TIMES A DAY, Disp: 180 tablet, Rfl: 1    furosemide (Lasix) 40 mg tablet, Take 1 tablet (40 mg) by mouth once daily., Disp: 90 tablet, Rfl: 1    losartan (Cozaar) 50 mg tablet, Take 1 tablet (50 mg) by mouth once daily., Disp: 90 tablet, Rfl: 1    metoprolol succinate XL (Toprol-XL) 50 mg 24 hr tablet, Take 1 tablet (50 mg) by mouth once daily., Disp: 90 tablet, Rfl: 1    omeprazole (PriLOSEC) 20 mg DR capsule, Take 1 capsule (20 mg) by mouth once daily., Disp: , Rfl:     potassium chloride CR 20 mEq ER tablet, Take 1 tablet (20 mEq) by mouth once daily., Disp: 90 tablet, Rfl: 1    spironolactone (Aldactone) 25 mg tablet, Take 1 tablet (25 mg) by mouth once daily., Disp: 90 tablet, Rfl: 1    UNABLE TO FIND, Take 1,000 mg by mouth once daily. Take Bee Pollen 1000 mg by mouth once daily., Disp: , Rfl:      HPI   Patient has a past medical history of atrial fibrillation.  She has permanent pacemaker in place 95% ventricular pacing.  Has a history of chronic diastolic heart failure and hypertension.  MPI in 2020 showed no evidence for ischemia.  Echocardiogram at that time showed preserved systolic function.    Review of Systems   Constitutional:  Negative for activity change, chills and fever.   HENT:  Negative for hearing loss.    Eyes: Negative.    Respiratory:  Negative for cough, chest tightness, shortness of breath and wheezing.    Cardiovascular:  Negative for chest pain, palpitations and leg swelling.   Gastrointestinal:   Negative for abdominal distention and blood in stool.   Genitourinary:  Negative for hematuria.   Neurological:  Negative for syncope, weakness and light-headedness.   Psychiatric/Behavioral:  Negative for confusion.        Objective     BP 99/62 (BP Location: Right arm, Patient Position: Sitting, BP Cuff Size: Adult)   Pulse 62   Resp 20   Wt (!) 27.8 kg (61 lb 4.8 oz)   SpO2 96%   BMI 11.97 kg/m²     CONCLUSIONS:  1. Left ventricular systolic function is normal with a 65-70% estimated ejection fraction.  2. There is reduced right ventricular systolic function.  3. The left atrium is severely dilated.  4. There is moderate mitral annular calcification.  5. Slightly elevated RVSP.  6. Mild to moderate aortic valve stenosis.        Lab Results   Component Value Date    BUN 33 (H) 02/15/2024    CREATININE 0.94 02/15/2024     (H) 02/01/2023    MG 1.65 08/03/2023    K 4.3 02/15/2024     02/15/2024       Constitutional:       General:  NAD   HENT:      Head: Normocephalic     Mouth: Mucous membranes are moist.      Neck:  No JVD or HJR   Eyes:      Conjunctiva/sclera: Conjunctivae normal.    Cardiovascular:      Rate and Rhythm: Normal rate and regular rhythm/ irregularly irregular/ occasional premature beat.      Heart sounds:  S1 S2 normal, no murmur/systolic murmur/diastolic murmur, no S3 or S4   Pulmonary:      Pulmonary effort is normal.  Normal breath sounds/ diminished breath sounds/ diminished bases/ prolonged expiratory phase. No wheezing or rales.   Abdominal:      General: Bowel sounds are normal, non- tender to palpitations. Liver is non palpable at  right costal margin.   Musculoskeletal:         General: No swelling/ bilateral lower edema.  WHEAT well.   Skin:     General: Skin is warm and dry/ LE wound with dressing intact.   Neurological:      General: No focal deficit present.      Mental Status: alert and oriented to person, place, and time. Mental status is at baseline.     Psychiatric:          Mood and Affect: Normal Affect.       Assessment/Plan     Problem List Items Addressed This Visit       Arteriosclerosis of right carotid artery    Benign essential hypertension    Chronic atrial fibrillation (Multi)    Paroxysmal atrial fibrillation (Multi) - Primary        Chronic diastolic heart failure   Etiology non ischemic   AHA Stage: C  NYHA class: 2  Volume Status: Euvolemic.   GFR: 57    GDMT:  BB-  Metoprolol   ARB/ACEI/ARNI - Losartan   MRA - spironolactone   SGLT2i -   Diuretic - Furosemide 20 mg every day.  Take 40 mg if significant weight gain or swelling.   Device Therapy:     CHF: Patient is well compensated today.  She is doing well with current medications.  Will make the furosemide adjustable as patient did have a fall in the interval since last visit.   Will continue current medications. Ordered follow up labs.   Follow up in clinic 4 months.     Disease process and medications discussed. Questions answered fully.  Emphasized salt restriction.  Encouraged daily monitoring of the patient's weight.  Encouraged regular exercise.  Labs ordered today.  Follow up in 4 months.    2. Permanent atrial fibrillation:  HR is controlled. Chronic oral anticoagulation with Elquis and no obvious bleeding.  Stable.     3. Moderate Aortic Stenosis per last echocardiogram.  Monitor.  See order for echocardiogram.           Ingris Maria, APRN-CNP

## 2024-08-23 DIAGNOSIS — I48.0 PAROXYSMAL ATRIAL FIBRILLATION (MULTI): ICD-10-CM

## 2024-08-23 NOTE — TELEPHONE ENCOUNTER
Patients daughter called in for mothes Patient called for refill of Eliquis 5 mg Twice Daily.  Please send to [facility].   Amelie Wick in [place]

## 2024-08-29 DIAGNOSIS — I50.32 CHRONIC DIASTOLIC CONGESTIVE HEART FAILURE (MULTI): ICD-10-CM

## 2024-08-29 DIAGNOSIS — I48.0 PAROXYSMAL ATRIAL FIBRILLATION (MULTI): ICD-10-CM

## 2024-08-29 DIAGNOSIS — Z86.73 HISTORY OF CEREBROVASCULAR ACCIDENT: ICD-10-CM

## 2024-08-29 DIAGNOSIS — G45.9 TIA (TRANSIENT ISCHEMIC ATTACK): ICD-10-CM

## 2024-08-29 DIAGNOSIS — I50.9 CONGESTIVE HEART FAILURE, UNSPECIFIED HF CHRONICITY, UNSPECIFIED HEART FAILURE TYPE (MULTI): ICD-10-CM

## 2024-08-29 DIAGNOSIS — I65.21 ARTERIOSCLEROSIS OF RIGHT CAROTID ARTERY: ICD-10-CM

## 2024-08-29 RX ORDER — ATORVASTATIN CALCIUM 40 MG/1
40 TABLET, FILM COATED ORAL NIGHTLY
Qty: 90 TABLET | Refills: 1 | Status: SHIPPED | OUTPATIENT
Start: 2024-08-29 | End: 2025-02-25

## 2024-08-29 RX ORDER — POTASSIUM CHLORIDE 20 MEQ/1
20 TABLET, EXTENDED RELEASE ORAL DAILY
Qty: 90 TABLET | Refills: 1 | Status: SHIPPED | OUTPATIENT
Start: 2024-08-29 | End: 2025-02-25

## 2024-08-29 RX ORDER — METOPROLOL SUCCINATE 50 MG/1
50 TABLET, EXTENDED RELEASE ORAL DAILY
Qty: 90 TABLET | Refills: 1 | Status: SHIPPED | OUTPATIENT
Start: 2024-08-29 | End: 2025-02-25

## 2024-08-29 RX ORDER — SPIRONOLACTONE 25 MG/1
25 TABLET ORAL DAILY
Qty: 90 TABLET | Refills: 1 | Status: SHIPPED | OUTPATIENT
Start: 2024-08-29 | End: 2025-02-25

## 2024-08-29 RX ORDER — LOSARTAN POTASSIUM 50 MG/1
50 TABLET ORAL DAILY
Qty: 90 TABLET | Refills: 1 | Status: SHIPPED | OUTPATIENT
Start: 2024-08-29 | End: 2025-02-25

## 2024-08-29 NOTE — TELEPHONE ENCOUNTER
Called her daughter back and asked her to take her to get her blood work done.  Need to monitor her kidney function and electrolytes to safely continue medications.  She verbalized understanding.

## 2024-08-29 NOTE — TELEPHONE ENCOUNTER
----- Message from Linette ZAPIEN sent at 8/29/2024  2:28 PM EDT -----  Regarding: chf med refills  Patient's dtr called for refills on her medications with [facility], Esperanza stated that the patient's cardiologist is refilling meds while Ingr[facility] is out of office.  Adwoa Stevens (dtr) [phone]

## 2024-08-30 ENCOUNTER — LAB (OUTPATIENT)
Dept: LAB | Facility: LAB | Age: 89
End: 2024-08-30
Payer: COMMERCIAL

## 2024-08-30 DIAGNOSIS — I65.21 ARTERIOSCLEROSIS OF RIGHT CAROTID ARTERY: ICD-10-CM

## 2024-08-30 DIAGNOSIS — I48.20 CHRONIC ATRIAL FIBRILLATION (MULTI): ICD-10-CM

## 2024-08-30 DIAGNOSIS — E04.1 THYROID NODULE: ICD-10-CM

## 2024-08-30 DIAGNOSIS — I10 BENIGN ESSENTIAL HYPERTENSION: ICD-10-CM

## 2024-08-30 DIAGNOSIS — I48.0 PAROXYSMAL ATRIAL FIBRILLATION (MULTI): ICD-10-CM

## 2024-08-30 LAB
ANION GAP SERPL CALC-SCNC: 12 MMOL/L (ref 10–20)
BUN SERPL-MCNC: 38 MG/DL (ref 6–23)
CALCIUM SERPL-MCNC: 10.6 MG/DL (ref 8.6–10.6)
CHLORIDE SERPL-SCNC: 107 MMOL/L (ref 98–107)
CO2 SERPL-SCNC: 24 MMOL/L (ref 21–32)
CREAT SERPL-MCNC: 1.09 MG/DL (ref 0.5–1.05)
EGFRCR SERPLBLD CKD-EPI 2021: 47 ML/MIN/1.73M*2
GLUCOSE SERPL-MCNC: 89 MG/DL (ref 74–99)
MAGNESIUM SERPL-MCNC: 1.7 MG/DL (ref 1.6–2.4)
POTASSIUM SERPL-SCNC: 4.5 MMOL/L (ref 3.5–5.3)
SODIUM SERPL-SCNC: 138 MMOL/L (ref 136–145)
TSH SERPL-ACNC: 0.9 MIU/L (ref 0.44–3.98)

## 2024-08-30 PROCEDURE — 80048 BASIC METABOLIC PNL TOTAL CA: CPT

## 2024-08-30 PROCEDURE — 84443 ASSAY THYROID STIM HORMONE: CPT

## 2024-08-30 PROCEDURE — 83735 ASSAY OF MAGNESIUM: CPT

## 2024-08-30 PROCEDURE — 36415 COLL VENOUS BLD VENIPUNCTURE: CPT

## 2024-09-03 ENCOUNTER — TELEPHONE (OUTPATIENT)
Dept: ENDOCRINOLOGY | Facility: CLINIC | Age: 89
End: 2024-09-03
Payer: COMMERCIAL

## 2024-09-24 ENCOUNTER — HOSPITAL ENCOUNTER (OUTPATIENT)
Dept: CARDIOLOGY | Facility: HOSPITAL | Age: 89
Discharge: HOME | End: 2024-09-24
Payer: COMMERCIAL

## 2024-09-24 DIAGNOSIS — I48.0 PAROXYSMAL ATRIAL FIBRILLATION (MULTI): ICD-10-CM

## 2024-09-24 DIAGNOSIS — I49.5 SICK SINUS SYNDROME (MULTI): ICD-10-CM

## 2024-09-24 DIAGNOSIS — Z95.0 PRESENCE OF CARDIAC PACEMAKER: Primary | ICD-10-CM

## 2024-09-24 DIAGNOSIS — Z95.0 PRESENCE OF CARDIAC PACEMAKER: ICD-10-CM

## 2024-09-24 PROCEDURE — 93279 PRGRMG DEV EVAL PM/LDLS PM: CPT

## 2024-10-10 ENCOUNTER — APPOINTMENT (OUTPATIENT)
Dept: CARDIOLOGY | Facility: HOSPITAL | Age: 89
End: 2024-10-10
Payer: COMMERCIAL

## 2024-11-24 DIAGNOSIS — K21.9 GASTROESOPHAGEAL REFLUX DISEASE, UNSPECIFIED WHETHER ESOPHAGITIS PRESENT: Primary | ICD-10-CM

## 2024-11-25 RX ORDER — OMEPRAZOLE 20 MG/1
20 CAPSULE, DELAYED RELEASE ORAL DAILY
Qty: 30 CAPSULE | Refills: 0 | Status: SHIPPED | OUTPATIENT
Start: 2024-11-25

## 2024-12-12 ENCOUNTER — APPOINTMENT (OUTPATIENT)
Dept: CARDIOLOGY | Facility: HOSPITAL | Age: 89
End: 2024-12-12
Payer: COMMERCIAL

## 2024-12-13 ENCOUNTER — APPOINTMENT (OUTPATIENT)
Dept: CARDIOLOGY | Facility: HOSPITAL | Age: 89
End: 2024-12-13
Payer: COMMERCIAL

## 2024-12-23 ENCOUNTER — LAB (OUTPATIENT)
Dept: LAB | Facility: LAB | Age: 89
End: 2024-12-23
Payer: COMMERCIAL

## 2024-12-23 ENCOUNTER — OFFICE VISIT (OUTPATIENT)
Dept: CARDIOLOGY | Facility: HOSPITAL | Age: 89
End: 2024-12-23
Payer: COMMERCIAL

## 2024-12-23 VITALS
TEMPERATURE: 98.6 F | DIASTOLIC BLOOD PRESSURE: 54 MMHG | HEART RATE: 60 BPM | OXYGEN SATURATION: 93 % | SYSTOLIC BLOOD PRESSURE: 103 MMHG | BODY MASS INDEX: 31.56 KG/M2 | RESPIRATION RATE: 20 BRPM | WEIGHT: 161.6 LBS

## 2024-12-23 DIAGNOSIS — I48.0 PAROXYSMAL ATRIAL FIBRILLATION (MULTI): ICD-10-CM

## 2024-12-23 DIAGNOSIS — I48.20 CHRONIC ATRIAL FIBRILLATION (MULTI): ICD-10-CM

## 2024-12-23 DIAGNOSIS — I50.9 CONGESTIVE HEART FAILURE, UNSPECIFIED HF CHRONICITY, UNSPECIFIED HEART FAILURE TYPE: ICD-10-CM

## 2024-12-23 DIAGNOSIS — I10 BENIGN ESSENTIAL HYPERTENSION: ICD-10-CM

## 2024-12-23 DIAGNOSIS — I50.32 CHRONIC DIASTOLIC CONGESTIVE HEART FAILURE: Primary | ICD-10-CM

## 2024-12-23 DIAGNOSIS — I50.32 CHRONIC DIASTOLIC CONGESTIVE HEART FAILURE: ICD-10-CM

## 2024-12-23 LAB
ANION GAP SERPL CALC-SCNC: 18 MMOL/L (ref 10–20)
BNP SERPL-MCNC: 260 PG/ML (ref 0–99)
BUN SERPL-MCNC: 39 MG/DL (ref 6–23)
CALCIUM SERPL-MCNC: 10.2 MG/DL (ref 8.6–10.3)
CHLORIDE SERPL-SCNC: 109 MMOL/L (ref 98–107)
CO2 SERPL-SCNC: 17 MMOL/L (ref 21–32)
CREAT SERPL-MCNC: 1.11 MG/DL (ref 0.5–1.05)
EGFRCR SERPLBLD CKD-EPI 2021: 46 ML/MIN/1.73M*2
GLUCOSE SERPL-MCNC: 111 MG/DL (ref 74–99)
MAGNESIUM SERPL-MCNC: 1.61 MG/DL (ref 1.6–2.4)
POTASSIUM SERPL-SCNC: 4.6 MMOL/L (ref 3.5–5.3)
SODIUM SERPL-SCNC: 139 MMOL/L (ref 136–145)

## 2024-12-23 PROCEDURE — 3078F DIAST BP <80 MM HG: CPT | Performed by: NURSE PRACTITIONER

## 2024-12-23 PROCEDURE — 83735 ASSAY OF MAGNESIUM: CPT

## 2024-12-23 PROCEDURE — 99213 OFFICE O/P EST LOW 20 MIN: CPT | Performed by: NURSE PRACTITIONER

## 2024-12-23 PROCEDURE — 1126F AMNT PAIN NOTED NONE PRSNT: CPT | Performed by: NURSE PRACTITIONER

## 2024-12-23 PROCEDURE — 1159F MED LIST DOCD IN RCRD: CPT | Performed by: NURSE PRACTITIONER

## 2024-12-23 PROCEDURE — 83880 ASSAY OF NATRIURETIC PEPTIDE: CPT

## 2024-12-23 PROCEDURE — 3074F SYST BP LT 130 MM HG: CPT | Performed by: NURSE PRACTITIONER

## 2024-12-23 PROCEDURE — 80048 BASIC METABOLIC PNL TOTAL CA: CPT

## 2024-12-23 RX ORDER — FUROSEMIDE 40 MG/1
20 TABLET ORAL DAILY
Qty: 45 TABLET | Refills: 3 | Status: SHIPPED | OUTPATIENT
Start: 2024-12-23 | End: 2025-12-23

## 2024-12-23 RX ORDER — POTASSIUM CHLORIDE 20 MEQ/1
20 TABLET, EXTENDED RELEASE ORAL DAILY
Qty: 90 TABLET | Refills: 3 | Status: SHIPPED | OUTPATIENT
Start: 2024-12-23 | End: 2025-12-23

## 2024-12-23 RX ORDER — LOSARTAN POTASSIUM 50 MG/1
50 TABLET ORAL DAILY
Qty: 90 TABLET | Refills: 3 | Status: SHIPPED | OUTPATIENT
Start: 2024-12-23 | End: 2025-12-23

## 2024-12-23 RX ORDER — METOPROLOL SUCCINATE 50 MG/1
50 TABLET, EXTENDED RELEASE ORAL DAILY
Qty: 90 TABLET | Refills: 3 | Status: SHIPPED | OUTPATIENT
Start: 2024-12-23 | End: 2025-12-23

## 2024-12-23 RX ORDER — SPIRONOLACTONE 25 MG/1
25 TABLET ORAL DAILY
Qty: 90 TABLET | Refills: 1 | Status: SHIPPED | OUTPATIENT
Start: 2024-12-23 | End: 2025-06-21

## 2024-12-23 ASSESSMENT — ENCOUNTER SYMPTOMS
OCCASIONAL FEELINGS OF UNSTEADINESS: 0
LOSS OF SENSATION IN FEET: 0
DEPRESSION: 0

## 2024-12-23 ASSESSMENT — PATIENT HEALTH QUESTIONNAIRE - PHQ9
SUM OF ALL RESPONSES TO PHQ9 QUESTIONS 1 AND 2: 0
1. LITTLE INTEREST OR PLEASURE IN DOING THINGS: NOT AT ALL
2. FEELING DOWN, DEPRESSED OR HOPELESS: NOT AT ALL

## 2024-12-23 ASSESSMENT — PAIN SCALES - GENERAL: PAINLEVEL_OUTOF10: 0-NO PAIN

## 2024-12-23 ASSESSMENT — COLUMBIA-SUICIDE SEVERITY RATING SCALE - C-SSRS
1. IN THE PAST MONTH, HAVE YOU WISHED YOU WERE DEAD OR WISHED YOU COULD GO TO SLEEP AND NOT WAKE UP?: NO
6. HAVE YOU EVER DONE ANYTHING, STARTED TO DO ANYTHING, OR PREPARED TO DO ANYTHING TO END YOUR LIFE?: NO
2. HAVE YOU ACTUALLY HAD ANY THOUGHTS OF KILLING YOURSELF?: NO

## 2024-12-23 NOTE — PROGRESS NOTES
Subjective   Patient ID: Anna Magdaleno is a 89 y.o. female who presents for follow-up of congestive heart failure.     Current Outpatient Medications:     apixaban (Eliquis) 5 mg tablet, Take 1 tablet (5 mg) by mouth 2 times a day., Disp: 180 tablet, Rfl: 3    atorvastatin (Lipitor) 40 mg tablet, Take 1 tablet (40 mg) by mouth once daily at bedtime., Disp: 90 tablet, Rfl: 1    cholecalciferol, vitamin D3, (VITAMIN D3 ORAL), Take 1.25 mg by mouth 2 times a week. Take 1.25 mg (50,000 international units) by mouth twice weekly., Disp: , Rfl:     furosemide (Lasix) 40 mg tablet, Take 0.5 tablets (20 mg) by mouth once daily. Please take 40 mg if increase in weight or swelling or increased shortness of breath., Disp: 90 tablet, Rfl: 1    losartan (Cozaar) 50 mg tablet, Take 1 tablet (50 mg) by mouth once daily., Disp: 90 tablet, Rfl: 1    metoprolol succinate XL (Toprol-XL) 50 mg 24 hr tablet, Take 1 tablet (50 mg) by mouth once daily., Disp: 90 tablet, Rfl: 1    omeprazole (PriLOSEC) 20 mg DR capsule, TAKE ONE CAPSULE BY MOUTH DAILY, Disp: 30 capsule, Rfl: 0    potassium chloride CR 20 mEq ER tablet, Take 1 tablet (20 mEq) by mouth once daily., Disp: 90 tablet, Rfl: 1    spironolactone (Aldactone) 25 mg tablet, Take 1 tablet (25 mg) by mouth once daily., Disp: 90 tablet, Rfl: 1     HPI     Patient has a past medical history of atrial fibrillation.  She has permanent pacemaker in place 95% ventricular pacing.  Has a history of chronic diastolic heart failure and hypertension.  MPI in 2020 showed no evidence for ischemia.  Echocardiogram at that time showed preserved systolic function.    Daughter states she is doing quite well.  No major changes in activity.  No chest pain or palpitations patient offers no complaints today.  No shortness of breath       Review of Systems   Constitutional:  Negative for activity change, chills and fever.   HENT:  Negative for hearing loss.    Eyes: Negative.    Respiratory:  Negative for  cough, chest tightness, shortness of breath and wheezing.         Denies orthopnea or PND   Cardiovascular:  Negative for chest pain, palpitations and leg swelling.   Gastrointestinal:  Negative for abdominal distention and blood in stool.   Genitourinary:  Negative for hematuria.   Neurological:  Negative for syncope, weakness and light-headedness.   Psychiatric/Behavioral:  Negative for confusion.         Has memory loss.       Objective     /54 (BP Location: Left arm, Patient Position: Sitting)   Pulse 60   Temp 37 °C (98.6 °F)   Resp 20   Wt 73.3 kg (161 lb 9.6 oz)   SpO2 93%   BMI 31.56 kg/m²       20223 Echocardiogram   CONCLUSIONS:  1. Left ventricular systolic function is normal with a 65-70% estimated ejection fraction.  2. There is reduced right ventricular systolic function.  3. The left atrium is severely dilated.  4. There is moderate mitral annular calcification.  5. Slightly elevated RVSP.  6. Mild to moderate aortic valve stenosis.      Lab Results   Component Value Date    BUN 38 (H) 08/30/2024    CREATININE 1.09 (H) 08/30/2024     (H) 02/01/2023    MG 1.70 08/30/2024    K 4.5 08/30/2024     08/30/2024       Constitutional:       General:  NAD   HENT:      Head: Normocephalic     Mouth: Mucous membranes are moist.      Neck:  No JVD or HJR   Eyes:      Conjunctiva/sclera: Conjunctivae normal.    Cardiovascular:      Rate and Rhythm: irregularly irregular rhythm.      Heart sounds:  S1 S2 normal, + systolic murmur , no S3 or S4   Pulmonary:      Pulmonary effort is normal.  Normal breath sounds/No wheezing or rales.   Abdominal:      General: Bowel sounds are normal, non- tender to palpitations. Liver is non palpable at  right costal margin.   Musculoskeletal:         General: No swelling  WHEAT well.   Skin:     General: Skin is warm and dry   Neurological:      General: No focal deficit present.      Mental Status: alert and oriented to person, place, and time. Mental status  is at baseline.     Psychiatric:         Mood and Affect: Normal Affect.     Assessment/Plan     Problem List Items Addressed This Visit       Benign essential hypertension    Chronic atrial fibrillation (Multi)    Paroxysmal atrial fibrillation (Multi)    Congestive heart failure      Chronic diastolic heart failure   Etiology non ischemic   AHA Stage: C  NYHA class: 2  Volume Status: Euvolemic.   GFR: 57     GDMT:  BB-  Metoprolol   ARB/ACEI/ARNI - Losartan   MRA - spironolactone   SGLT2i -   Diuretic - Furosemide 20 mg every day.  Take 40 mg if significant weight gain or swelling.   Device Therapy:      CHF: Patient is well compensated today.  She is doing well with current medications.  Will make the furosemide adjustable as patient did have a fall in the interval since last visit.   Will continue current medications. Ordered follow up labs.   Follow up in clinic 3 months.      Disease process and medications discussed. Questions answered fully.  Emphasized salt restriction.  Encouraged daily monitoring of the patient's weight.  Encouraged regular exercise.  Labs ordered today.  Follow up in 3 months.  Will repeat echocardiogram - daughter will call to schedule.      2. Permanent atrial fibrillation:  HR is controlled. Chronic oral anticoagulation with Elquis and no obvious bleeding.  Stable.      3. Moderate Aortic Stenosis per last echocardiogram.  Monitor.  See order for echocardiogram.       Ingris Maria, APRN-CNP

## 2024-12-23 NOTE — PATIENT INSTRUCTIONS
Thank you for coming in today.  If you have any questions you may contact the office Monday through Friday at [phone] or on week ends at [phone].    Continue current medications.     Please have lab work completed.     Please schedule echocardiogram       Please follow  a 2 GM sodium diet and limit fluid intake to 2 liters per day or 8 servings ( serving size = 8 oz. = 1 cup = 240 ml) per day.   Please avoid processed meat products (luncheon meats, sausages, jaquez, hot dogs for example) eat 4 servings of vegetables and 1-2 whole servings of whole fruits per day.   Please weigh daily and call [phone] for weight gain of 3 pounds in 24 hours or 5 pounds or if you experience increased swelling or shortness of breath.      Follow up:   Follow uop in 3 months.     Please be sure to follow up with your cardiologist at [facility] once every year. Call [phone] to schedule appointment if you do not have a follow up appointment scheduled already.

## 2024-12-24 ASSESSMENT — ENCOUNTER SYMPTOMS
ACTIVITY CHANGE: 0
HEMATURIA: 0
ABDOMINAL DISTENTION: 0
BLOOD IN STOOL: 0
CHILLS: 0
SHORTNESS OF BREATH: 0
PALPITATIONS: 0
FEVER: 0
LIGHT-HEADEDNESS: 0
EYES NEGATIVE: 1
CONFUSION: 0
WHEEZING: 0
WEAKNESS: 0
CHEST TIGHTNESS: 0
COUGH: 0

## 2025-02-05 ENCOUNTER — APPOINTMENT (OUTPATIENT)
Dept: CARDIOLOGY | Facility: HOSPITAL | Age: OVER 89
End: 2025-02-05
Payer: COMMERCIAL

## 2025-02-06 ENCOUNTER — HOSPITAL ENCOUNTER (OUTPATIENT)
Dept: CARDIOLOGY | Facility: HOSPITAL | Age: OVER 89
Discharge: HOME | End: 2025-02-06
Payer: COMMERCIAL

## 2025-02-06 DIAGNOSIS — Z95.0 PRESENCE OF CARDIAC PACEMAKER: ICD-10-CM

## 2025-02-06 DIAGNOSIS — I48.0 PAROXYSMAL ATRIAL FIBRILLATION (MULTI): ICD-10-CM

## 2025-02-06 PROCEDURE — 93296 REM INTERROG EVL PM/IDS: CPT

## 2025-02-13 ENCOUNTER — APPOINTMENT (OUTPATIENT)
Dept: CARDIOLOGY | Facility: CLINIC | Age: OVER 89
End: 2025-02-13
Payer: COMMERCIAL

## 2025-02-13 VITALS
WEIGHT: 164 LBS | BODY MASS INDEX: 33.06 KG/M2 | HEART RATE: 62 BPM | SYSTOLIC BLOOD PRESSURE: 98 MMHG | HEIGHT: 59 IN | DIASTOLIC BLOOD PRESSURE: 50 MMHG

## 2025-02-13 DIAGNOSIS — I48.20 CHRONIC ATRIAL FIBRILLATION (MULTI): Primary | ICD-10-CM

## 2025-02-13 DIAGNOSIS — R29.6 FALLS FREQUENTLY: ICD-10-CM

## 2025-02-13 DIAGNOSIS — I48.0 PAROXYSMAL ATRIAL FIBRILLATION (MULTI): ICD-10-CM

## 2025-02-13 DIAGNOSIS — I10 BENIGN ESSENTIAL HYPERTENSION: ICD-10-CM

## 2025-02-13 DIAGNOSIS — Z95.0 PRESENCE OF CARDIAC PACEMAKER: ICD-10-CM

## 2025-02-13 DIAGNOSIS — G45.9 TIA (TRANSIENT ISCHEMIC ATTACK): ICD-10-CM

## 2025-02-13 DIAGNOSIS — I35.0 MODERATE AORTIC VALVE STENOSIS: ICD-10-CM

## 2025-02-13 DIAGNOSIS — I65.21 ARTERIOSCLEROSIS OF RIGHT CAROTID ARTERY: ICD-10-CM

## 2025-02-13 DIAGNOSIS — I50.32 CHRONIC DIASTOLIC CONGESTIVE HEART FAILURE: ICD-10-CM

## 2025-02-13 DIAGNOSIS — I50.9 CONGESTIVE HEART FAILURE, UNSPECIFIED HF CHRONICITY, UNSPECIFIED HEART FAILURE TYPE: ICD-10-CM

## 2025-02-13 LAB
ATRIAL RATE: 47 BPM
Q ONSET: 196 MS
QRS COUNT: 11 BEATS
QRS DURATION: 160 MS
QT INTERVAL: 448 MS
QTC CALCULATION(BAZETT): 454 MS
QTC FREDERICIA: 453 MS
R AXIS: -75 DEGREES
T AXIS: 85 DEGREES
T OFFSET: 420 MS
VENTRICULAR RATE: 62 BPM

## 2025-02-13 PROCEDURE — 1160F RVW MEDS BY RX/DR IN RCRD: CPT | Performed by: INTERNAL MEDICINE

## 2025-02-13 PROCEDURE — 1036F TOBACCO NON-USER: CPT | Performed by: INTERNAL MEDICINE

## 2025-02-13 PROCEDURE — 93005 ELECTROCARDIOGRAM TRACING: CPT | Performed by: INTERNAL MEDICINE

## 2025-02-13 PROCEDURE — 3078F DIAST BP <80 MM HG: CPT | Performed by: INTERNAL MEDICINE

## 2025-02-13 PROCEDURE — 1159F MED LIST DOCD IN RCRD: CPT | Performed by: INTERNAL MEDICINE

## 2025-02-13 PROCEDURE — 99214 OFFICE O/P EST MOD 30 MIN: CPT | Mod: 25 | Performed by: INTERNAL MEDICINE

## 2025-02-13 PROCEDURE — 99214 OFFICE O/P EST MOD 30 MIN: CPT | Performed by: INTERNAL MEDICINE

## 2025-02-13 PROCEDURE — 3074F SYST BP LT 130 MM HG: CPT | Performed by: INTERNAL MEDICINE

## 2025-02-13 RX ORDER — LOSARTAN POTASSIUM 25 MG/1
25 TABLET ORAL DAILY
Qty: 90 TABLET | Refills: 3 | Status: SHIPPED | OUTPATIENT
Start: 2025-02-13 | End: 2026-02-13

## 2025-02-13 ASSESSMENT — ENCOUNTER SYMPTOMS
LOSS OF SENSATION IN FEET: 0
OCCASIONAL FEELINGS OF UNSTEADINESS: 1
DEPRESSION: 0

## 2025-02-13 NOTE — PROGRESS NOTES
"Chief Complaint:   Follow-up (annual)     History Of Present Illness:    Anna Magdaleno is a 89 y.o. female presenting for annual follow-up.    She has a past medical history of chronic diastolic heart failure, hypertension, carotid artery disease, history of right carotid endarterectomy in the setting of TIA, chronic atrial fibrillation and history of symptomatic bradycardia who has a pacemaker in place.     She is also following up in CHF clinic. She had an hospitalization with acute heart failure in December 2021. Likely in the setting of fall and increased diclofenac use, uncontrolled blood pressure.  She has and having low blood pressure readings lately according to her daughter.    She does not have chest pain with exertion. Sometimes she gets abdominal pain depending on what she eats and her daughter states that may be because she has gallstones.     Reviewed pacemaker report, VVI paced, last checked February 6, 2025. EKG reviewed and appears to be underlying atrial fibrillation with V paced rhythm.     She had a stress test in 2020 showing normal perfusion normal LV function. She had an echocardiogram in 2020 showing normal LV function, left atrial enlargement, mild pulmonary hypertension.   Other echo in 2023 with normal LV function left atrial enlargement mild pulmonary hypertension and mild aortic valve stenosis.     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    Past Surgical History:  She has a past surgical history that includes Tonsillectomy (2017); Appendectomy (2017); Other surgical history (2021); Cardiac pacemaker placement (2021); Other surgical history (10/29/2020); CT angio head w and wo IV contrast (2020); and CT angio neck (2020).      Social History:  She reports that she has never smoked. She has never used smokeless tobacco. She reports that she does not currently use alcohol. She reports that she does not use drugs.    Family History:  Family History   Problem Relation Name Age of Onset    Colon cancer Mother      Unexplained death Father           in  when patient a young child        Allergies:  Ace inhibitors    Outpatient Medications:  Current Outpatient Medications   Medication Instructions    apixaban (ELIQUIS) 5 mg, oral, 2 times daily    atorvastatin (LIPITOR) 40 mg, oral, Nightly    cholecalciferol, vitamin D3, (VITAMIN D3 ORAL) 1.25 mg, 2 times weekly    furosemide (LASIX) 20 mg, oral, Daily, Please take 40 mg as needed Anna has if increase in weight or swelling or increased shortness of breath.    losartan (COZAAR) 50 mg, oral, Daily    metoprolol succinate XL (TOPROL-XL) 50 mg, oral, Daily    omeprazole (PRILOSEC) 20 mg, oral, Daily    potassium chloride CR 20 mEq ER tablet 20 mEq, oral, Daily    spironolactone (ALDACTONE) 25 mg, oral, Daily       Physical Exam:  Physical Exam  Vitals reviewed.   Constitutional:       Appearance: Normal appearance.   Neck:      Vascular: No carotid bruit or JVD.   Cardiovascular:      Rate and Rhythm: Normal rate and regular rhythm.      Pulses: Normal pulses.      Heart  "sounds: S1 normal and S2 normal. Murmur heard.      Systolic murmur is present with a grade of 1/6.   Pulmonary:      Effort: Pulmonary effort is normal. No respiratory distress.      Breath sounds: No wheezing or rales.   Abdominal:      General: Abdomen is flat.      Palpations: Abdomen is soft.   Musculoskeletal:      Right lower leg: No edema.      Left lower leg: No edema.   Skin:     General: Skin is warm.   Neurological:      Mental Status: She is alert and oriented to person, place, and time. Mental status is at baseline.   Psychiatric:         Mood and Affect: Mood normal.         Behavior: Behavior normal.           Last Labs:  CBC -  Lab Results   Component Value Date    WBC 5.3 02/15/2024    HGB 13.8 02/15/2024    HCT 41.0 02/15/2024    MCV 93 02/15/2024     02/15/2024       CMP -  Lab Results   Component Value Date    CALCIUM 10.2 12/23/2024    PHOS 3.0 09/02/2020    PROT 6.6 08/03/2023    ALBUMIN 3.5 08/03/2023     (H) 08/03/2023     (H) 08/03/2023    ALKPHOS 85 08/03/2023    BILITOT 0.8 08/03/2023       LIPID PANEL -   Lab Results   Component Value Date    CHOL 92 02/15/2024    TRIG 88 02/15/2024    HDL 31.2 02/15/2024    CHHDL 2.9 02/15/2024    LDLF 34 02/01/2023    VLDL 18 02/15/2024    NHDL 61 02/15/2024       RENAL FUNCTION PANEL -   Lab Results   Component Value Date    GLUCOSE 111 (H) 12/23/2024     12/23/2024    K 4.6 12/23/2024     (H) 12/23/2024    CO2 17 (L) 12/23/2024    ANIONGAP 18 12/23/2024    BUN 39 (H) 12/23/2024    CREATININE 1.11 (H) 12/23/2024    CALCIUM 10.2 12/23/2024    PHOS 3.0 09/02/2020    ALBUMIN 3.5 08/03/2023        Lab Results   Component Value Date     (H) 12/23/2024    HGBA1C 5.4 02/15/2024       Last Cardiology Tests:  ECG:  ECG 12 Lead 02/08/2024      Echo:  No results found for this or any previous visit from the past 1095 days.      Ejection Fractions:  No results found for: \"EF\"    Cath:  No results found for this or any " previous visit from the past 1095 days.      Stress Test:  No results found for this or any previous visit from the past 1095 days.      Cardiac Imaging:  No results found for this or any previous visit from the past 1095 days.          Assessment/Plan   In summary Ms. Magdaleno is a 89-year-old lady presenting with acute on chronic diastolic heart failure.     1-chronic diastolic heart failure-well compensated on physical examination. Continue present therapy, but due to low blood pressure we will reduce losartan dose to 25 mg a day.  Advised her daughter that after a week if blood pressure is still low to reduce the Toprol dose by half..  Continue to follow-up in CHF clinic. Sodium restriction discussed.  Will need close monitoring of BMP.      2-chronic atrial fibrillation-heart rate well controlled and chronically anticoagulated.     3-peripheral vascular disease-appropriately on a statin. Will check lipid profile.     In the past she has been referred to geriatric program for fall prevention.    Hetal Lopes MD

## 2025-03-10 DIAGNOSIS — K21.9 GASTROESOPHAGEAL REFLUX DISEASE, UNSPECIFIED WHETHER ESOPHAGITIS PRESENT: ICD-10-CM

## 2025-03-11 RX ORDER — OMEPRAZOLE 20 MG/1
20 CAPSULE, DELAYED RELEASE ORAL DAILY
Qty: 30 CAPSULE | Refills: 0 | Status: SHIPPED | OUTPATIENT
Start: 2025-03-11

## 2025-03-12 DIAGNOSIS — G45.9 TIA (TRANSIENT ISCHEMIC ATTACK): ICD-10-CM

## 2025-03-12 DIAGNOSIS — I65.21 ARTERIOSCLEROSIS OF RIGHT CAROTID ARTERY: ICD-10-CM

## 2025-03-12 DIAGNOSIS — Z86.73 HISTORY OF CEREBROVASCULAR ACCIDENT: ICD-10-CM

## 2025-03-12 RX ORDER — ATORVASTATIN CALCIUM 40 MG/1
40 TABLET, FILM COATED ORAL NIGHTLY
Qty: 90 TABLET | Refills: 1 | Status: SHIPPED | OUTPATIENT
Start: 2025-03-12 | End: 2025-09-08

## 2025-03-28 ENCOUNTER — APPOINTMENT (OUTPATIENT)
Dept: CARDIOLOGY | Facility: HOSPITAL | Age: OVER 89
End: 2025-03-28
Payer: COMMERCIAL

## 2025-04-07 ENCOUNTER — OFFICE VISIT (OUTPATIENT)
Dept: CARDIOLOGY | Facility: HOSPITAL | Age: OVER 89
End: 2025-04-07
Payer: COMMERCIAL

## 2025-04-07 VITALS
RESPIRATION RATE: 26 BRPM | DIASTOLIC BLOOD PRESSURE: 60 MMHG | HEART RATE: 60 BPM | OXYGEN SATURATION: 95 % | SYSTOLIC BLOOD PRESSURE: 125 MMHG | BODY MASS INDEX: 33.53 KG/M2 | WEIGHT: 166 LBS

## 2025-04-07 DIAGNOSIS — I48.20 CHRONIC ATRIAL FIBRILLATION (MULTI): ICD-10-CM

## 2025-04-07 DIAGNOSIS — I50.32 CHRONIC DIASTOLIC CONGESTIVE HEART FAILURE: Primary | ICD-10-CM

## 2025-04-07 DIAGNOSIS — I10 BENIGN ESSENTIAL HYPERTENSION: ICD-10-CM

## 2025-04-07 DIAGNOSIS — R32 URINARY INCONTINENCE, UNSPECIFIED TYPE: ICD-10-CM

## 2025-04-07 PROCEDURE — 99213 OFFICE O/P EST LOW 20 MIN: CPT | Performed by: NURSE PRACTITIONER

## 2025-04-07 PROCEDURE — 3074F SYST BP LT 130 MM HG: CPT | Performed by: NURSE PRACTITIONER

## 2025-04-07 PROCEDURE — 1036F TOBACCO NON-USER: CPT | Performed by: NURSE PRACTITIONER

## 2025-04-07 PROCEDURE — 3078F DIAST BP <80 MM HG: CPT | Performed by: NURSE PRACTITIONER

## 2025-04-07 PROCEDURE — 1159F MED LIST DOCD IN RCRD: CPT | Performed by: NURSE PRACTITIONER

## 2025-04-07 PROCEDURE — G2211 COMPLEX E/M VISIT ADD ON: HCPCS | Performed by: NURSE PRACTITIONER

## 2025-04-07 ASSESSMENT — ENCOUNTER SYMPTOMS
ABDOMINAL PAIN: 0
PALPITATIONS: 0
CLAUDICATION: 0
SHORTNESS OF BREATH: 0
UNEXPECTED WEIGHT CHANGE: 0
EDEMA: 0
ORTHOPNEA: 0
FATIGUE: 0
NEAR-SYNCOPE: 0
CHEST PRESSURE: 0

## 2025-04-07 NOTE — PATIENT INSTRUCTIONS
Thank you for coming in today.  If you have any questions you may contact the office Monday through Friday at [phone] or on week ends at [phone].    Please continue current medications.     Please follow  a 2 GM sodium diet and limit fluid intake to 2 liters per day or 8 servings ( serving size = 8 oz. = 1 cup = 240 ml) per day.   Please avoid processed meat products (luncheon meats, sausages, jaquez, hot dogs for example) eat 4 servings of vegetables and 1-2 whole servings of whole fruits per day.   Please weigh daily and call [phone] for weight gain of 3 pounds in 24 hours or 5 pounds or if you experience increased swelling or shortness of breath.     Lab work in 1 month     Follow up:  5 months    Please be sure to follow up with your cardiologist at [facility] once every year. Call [phone] to schedule appointment if you do not have a follow up appointment scheduled already.

## 2025-04-07 NOTE — PROGRESS NOTES
Subjective   Patient ID: Anna Magdaleno is a 89 y.o. female who presents for follow-up of congestive heart failure.     Current Outpatient Medications:     apixaban (Eliquis) 5 mg tablet, Take 1 tablet (5 mg) by mouth 2 times a day., Disp: 180 tablet, Rfl: 3    atorvastatin (Lipitor) 40 mg tablet, Take 1 tablet (40 mg) by mouth once daily at bedtime., Disp: 90 tablet, Rfl: 1    cholecalciferol, vitamin D3, (VITAMIN D3 ORAL), Take 1.25 mg by mouth 2 times a week. Take 1.25 mg (50,000 international units) by mouth twice weekly., Disp: , Rfl:     furosemide (Lasix) 40 mg tablet, Take 0.5 tablets (20 mg) by mouth once daily. Please take 40 mg as needed Anna has if increase in weight or swelling or increased shortness of breath., Disp: 45 tablet, Rfl: 3    losartan (Cozaar) 25 mg tablet, Take 1 tablet (25 mg) by mouth once daily., Disp: 90 tablet, Rfl: 3    metoprolol succinate XL (Toprol-XL) 50 mg 24 hr tablet, Take 1 tablet (50 mg) by mouth once daily., Disp: 90 tablet, Rfl: 3    omeprazole (PriLOSEC) 20 mg DR capsule, TAKE ONE CAPSULE BY MOUTH DAILY, Disp: 30 capsule, Rfl: 0    potassium chloride CR 20 mEq ER tablet, Take 1 tablet (20 mEq) by mouth once daily., Disp: 90 tablet, Rfl: 3    spironolactone (Aldactone) 25 mg tablet, Take 1 tablet (25 mg) by mouth once daily., Disp: 90 tablet, Rfl: 1     PMH: Patient has a past medical history of atrial fibrillation.  She has permanent pacemaker in place 95% ventricular pacing.  Has a history of chronic diastolic heart failure and hypertension.  MPI in 2020 showed no evidence for ischemia.  Echocardiogram at that time showed preserved systolic function.  Daughter reports she is having significant incontinence.  But otherwise seems to be stable at this time.     Congestive Heart Failure  Presents for follow-up visit. Associated symptoms include nocturia (significant incontinence). Pertinent negatives include no abdominal pain, chest pain, chest pressure, claudication, edema,  fatigue, muscle weakness, near-syncope, orthopnea, palpitations, paroxysmal nocturnal dyspnea, shortness of breath or unexpected weight change. The symptoms have been stable. Compliance with total regimen is %. Compliance with diet is %. Compliance with medications is %.       Review of Systems   Constitutional:  Negative for fatigue and unexpected weight change.   Respiratory:  Negative for shortness of breath.    Cardiovascular:  Negative for chest pain, palpitations, claudication and near-syncope.   Gastrointestinal:  Negative for abdominal pain.   Genitourinary:  Positive for nocturia (significant incontinence).   Musculoskeletal:  Negative for muscle weakness.       Objective     /60 (BP Location: Right arm, Patient Position: Sitting, BP Cuff Size: Adult)   Pulse 60   Resp 26   Wt 75.3 kg (166 lb)   SpO2 95%   BMI 33.53 kg/m²     Lab Results   Component Value Date    BUN 39 (H) 12/23/2024    CREATININE 1.11 (H) 12/23/2024     (H) 12/23/2024    MG 1.61 12/23/2024    K 4.6 12/23/2024     12/23/2024       Constitutional:       General:  NAD   HENT:      Head: Normocephalic     Mouth: Mucous membranes are moist.      Neck:  No JVD or HJR   Eyes:      Conjunctiva/sclera: Conjunctivae normal.    Cardiovascular:      Rate and Rhythm: Normal rate and regular rhythm/ irregularly irregular/ occasional premature beat.      Heart sounds:  S1 S2 normal, no murmur, no S3 or S4   Pulmonary:      Pulmonary effort is normal.  Normal breath sounds/ diminished breath sounds/ diminished bases/ prolonged expiratory phase. No wheezing or rales.   Abdominal:      General: Bowel sounds are normal, non- tender to palpitations. Liver is non palpable at  right costal margin.   Musculoskeletal:         General: No swelling/ bilateral lower edema.  WHEAT well.   Skin:     General: Skin is warm and dry   Neurological:      General: No focal deficit present.      Mental Status: patient does not speak  English and has known decline in memory per daughter. Mental status is without change recently.     Psychiatric:         Mood and Affect: Normal    Assessment/Plan     Problem List Items Addressed This Visit       Benign essential hypertension    Relevant Orders    Follow Up In Cardiology    Chronic atrial fibrillation (Multi)    Relevant Orders    Follow Up In Cardiology    Congestive heart failure - Primary    Relevant Orders    Basic Metabolic Panel    Magnesium    Follow Up In Cardiology     Other Visit Diagnoses       Urinary incontinence, unspecified type        Relevant Orders    Referral to Urology             Chronic diastolic heart failure   Etiology non ischemic   AHA Stage: C  NYHA class: 2  Volume Status: Euvolemic.   GFR: 57     GDMT:  BB-  Metoprolol XL 50 mg daily   ARB/ACEI/ARNI - Losartan 25 mg daily   MRA - spironolactone 25 mg daily   SGLT2i -   Diuretic - Furosemide 20 mg as needed.  Take 40 mg if significant weight gain or swelling.   Device Therapy:      CHF: Patient is well compensated today.  She is doing well with current medications. Daughter is doing a good job of adjusting  furosemide based on patient sx.  She is  significantly incontinent however.  Will continue current medications. Ordered follow up labs.   Follow up in clinic 3 months.      Disease process and medications discussed. Questions answered fully.  Emphasized salt restriction.  Encouraged daily monitoring of the patient's weight.  Encouraged regular exercise.  Labs ordered today.  Follow up in 3 - 4  months.  Will repeat echocardiogram - daughter will call to schedule.      2. Permanent atrial fibrillation:  HR is controlled. Chronic oral anticoagulation with Elquis and no obvious bleeding.  Stable.      3. Moderate Aortic Stenosis per last echocardiogram.  Monitor.  See order for echocardiogram.     4.  Urinary incontinence:  I did make referral to urology  for evaluation to see if there is anything to offer at this point  for incontinence.        Ingris Maria, APRN-CNP

## 2025-05-07 DIAGNOSIS — I50.32 CHRONIC DIASTOLIC CONGESTIVE HEART FAILURE: ICD-10-CM

## 2025-05-08 ENCOUNTER — HOSPITAL ENCOUNTER (OUTPATIENT)
Dept: CARDIOLOGY | Facility: HOSPITAL | Age: OVER 89
Discharge: HOME | End: 2025-05-08
Payer: COMMERCIAL

## 2025-05-08 DIAGNOSIS — Z95.0 PRESENCE OF CARDIAC PACEMAKER: ICD-10-CM

## 2025-05-08 DIAGNOSIS — I49.5 SICK SINUS SYNDROME (MULTI): Primary | ICD-10-CM

## 2025-05-08 DIAGNOSIS — I48.0 PAROXYSMAL ATRIAL FIBRILLATION (MULTI): ICD-10-CM

## 2025-05-08 PROCEDURE — 93296 REM INTERROG EVL PM/IDS: CPT

## 2025-05-29 DIAGNOSIS — I50.32 CHRONIC DIASTOLIC CONGESTIVE HEART FAILURE: ICD-10-CM

## 2025-05-29 RX ORDER — FUROSEMIDE 40 MG/1
20 TABLET ORAL DAILY
Qty: 45 TABLET | Refills: 1 | Status: SHIPPED | OUTPATIENT
Start: 2025-05-29 | End: 2026-05-29

## 2025-06-24 ENCOUNTER — HOSPITAL ENCOUNTER (OUTPATIENT)
Dept: CARDIOLOGY | Facility: HOSPITAL | Age: OVER 89
Discharge: HOME | End: 2025-06-24
Payer: COMMERCIAL

## 2025-06-24 DIAGNOSIS — Z95.0 PRESENCE OF CARDIAC PACEMAKER: ICD-10-CM

## 2025-06-24 DIAGNOSIS — I48.0 PAROXYSMAL ATRIAL FIBRILLATION (MULTI): ICD-10-CM

## 2025-06-26 ENCOUNTER — HOSPITAL ENCOUNTER (OUTPATIENT)
Dept: CARDIOLOGY | Facility: HOSPITAL | Age: OVER 89
Discharge: HOME | End: 2025-06-26
Payer: COMMERCIAL

## 2025-06-26 DIAGNOSIS — Z95.0 PRESENCE OF CARDIAC PACEMAKER: ICD-10-CM

## 2025-06-26 DIAGNOSIS — I48.0 PAROXYSMAL ATRIAL FIBRILLATION (MULTI): ICD-10-CM

## 2025-07-11 DIAGNOSIS — I50.9 CONGESTIVE HEART FAILURE, UNSPECIFIED HF CHRONICITY, UNSPECIFIED HEART FAILURE TYPE: ICD-10-CM

## 2025-07-14 RX ORDER — SPIRONOLACTONE 25 MG/1
25 TABLET ORAL DAILY
Qty: 90 TABLET | Refills: 1 | Status: SHIPPED | OUTPATIENT
Start: 2025-07-14 | End: 2025-07-17 | Stop reason: SDUPTHER

## 2025-07-15 LAB
ANION GAP SERPL CALCULATED.4IONS-SCNC: 7 MMOL/L (CALC) (ref 7–17)
BUN SERPL-MCNC: 27 MG/DL (ref 7–25)
BUN/CREAT SERPL: 28 (CALC) (ref 6–22)
CALCIUM SERPL-MCNC: 11.2 MG/DL (ref 8.6–10.4)
CHLORIDE SERPL-SCNC: 102 MMOL/L (ref 98–110)
CO2 SERPL-SCNC: 26 MMOL/L (ref 20–32)
CREAT SERPL-MCNC: 0.96 MG/DL (ref 0.6–0.95)
EGFRCR SERPLBLD CKD-EPI 2021: 55 ML/MIN/1.73M2
GLUCOSE SERPL-MCNC: 104 MG/DL (ref 65–99)
MAGNESIUM SERPL-MCNC: 1.9 MG/DL (ref 1.5–2.5)
POTASSIUM SERPL-SCNC: 4.7 MMOL/L (ref 3.5–5.3)
SODIUM SERPL-SCNC: 135 MMOL/L (ref 135–146)

## 2025-07-16 ENCOUNTER — TELEPHONE (OUTPATIENT)
Dept: CARDIOLOGY | Facility: HOSPITAL | Age: OVER 89
End: 2025-07-16

## 2025-07-16 DIAGNOSIS — I50.9 CONGESTIVE HEART FAILURE, UNSPECIFIED HF CHRONICITY, UNSPECIFIED HEART FAILURE TYPE: ICD-10-CM

## 2025-07-17 RX ORDER — SPIRONOLACTONE 25 MG/1
25 TABLET ORAL DAILY
Qty: 90 TABLET | Refills: 1 | Status: SHIPPED | OUTPATIENT
Start: 2025-07-17 | End: 2026-01-13

## 2025-08-13 ENCOUNTER — APPOINTMENT (OUTPATIENT)
Dept: CARDIOLOGY | Facility: HOSPITAL | Age: OVER 89
End: 2025-08-13
Payer: COMMERCIAL

## 2025-08-26 DIAGNOSIS — I48.0 PAROXYSMAL ATRIAL FIBRILLATION (MULTI): ICD-10-CM

## 2025-08-27 RX ORDER — APIXABAN 5 MG/1
5 TABLET, FILM COATED ORAL 2 TIMES DAILY
Qty: 180 TABLET | Refills: 0 | Status: SHIPPED | OUTPATIENT
Start: 2025-08-27